# Patient Record
Sex: MALE | Race: WHITE | Employment: OTHER | URBAN - METROPOLITAN AREA
[De-identification: names, ages, dates, MRNs, and addresses within clinical notes are randomized per-mention and may not be internally consistent; named-entity substitution may affect disease eponyms.]

---

## 2017-01-09 ENCOUNTER — ALLIED HEALTH/NURSE VISIT (OUTPATIENT)
Dept: CARDIOLOGY | Facility: CLINIC | Age: 82
End: 2017-01-09
Payer: MEDICARE

## 2017-01-09 DIAGNOSIS — I44.2 ATRIOVENTRICULAR BLOCK, COMPLETE (H): Primary | ICD-10-CM

## 2017-01-09 PROCEDURE — 93280 PM DEVICE PROGR EVAL DUAL: CPT | Mod: ZF

## 2017-01-09 PROCEDURE — 93288 INTERROG EVL PM/LDLS PM IP: CPT | Mod: 26 | Performed by: INTERNAL MEDICINE

## 2017-01-09 NOTE — PROGRESS NOTES
"Patient seen in clinic for evaluation and iterative programming of his Medtronic dual lead pacemaker per MD orders.  Normal pacemaker function.  1 mode switch episode recorded - <1 min in duration.  Intrinsic rhythm = SB with CHB.  AP = 78.3%.   = 99.9%.  Estimated battery longevity to SUKHWINDER = 3 years.  Patient reports that he is feeling well.  However, patient reports that when he exercises he cannot get his HR above 70's bpm and he sometimes feel \"woozy\".  Rate response programmed from medium low to low.  Patient ambulated in hallways with HR up to 92 bpm.  Plan for patient to send a remote transmission in 3 months and return to clinic in 6 months.    Dual lead pacemaker        "

## 2017-01-09 NOTE — MR AVS SNAPSHOT
After Visit Summary   1/9/2017    Andrew Marrero    MRN: [id number]           Patient Information     Date Of Birth          4/15/1929        Visit Information        Provider Department      1/9/2017 1:30 PM 1, Uc Cv Device [facility]         Follow-ups after your visit        Your next 10 appointments already scheduled     Jul 10, 2017  1:00 PM   (Arrive by 12:45 PM)   Pacemaker Check with Uc Cv Device 1   [facility] ([facility] [place])    [address]  [address]rd [facility] [address] [phone]              Who to contact     If you have questions or need follow up information about today's clinic visit or your schedule please contact [facility] directly at [phone].  Normal or non-critical lab and imaging results will be communicated to you by [employer] Ide[employer], letter or phone within 4 business days after the clinic has received the results. If you do not hear from us within 7 days, please contact the clinic through Click[employer] or phone. If you have a critical or abnormal lab result, we will notify you by phone as soon as possible.  Submit refill requests through [employer] or call your pharmacy and they will forward the refill request to us. Please allow 3 business days for your refill to be completed.          Additional Information About Your Visit        Engineering Ide[employer] Information     [employer] gives you secure access to your electronic health record. If you see a primary care provider, you can also send messages to your care team and make appointments. If you have questions, please call your primary care clinic.  If you do not have a primary care provider, please call [phone] and they will assist you.        Care EveryWhere ID     This is your Care EveryWhere ID. This could be used by other organizations to access your [place] medical records  [id number]         Blood Pressure from Last 3 Encounters:   09/22/16 110/72   07/12/16 101/66    10/29/15 134/80    Weight from Last 3 Encounters:   09/22/16 68.04 kg (150 lb)   07/12/16 70.081 kg (154 lb 8 oz)   11/11/15 69.4 kg (153 lb)              Today, you had the following     No orders found for display       Primary Care Provider Office Phone # Fax #    Hardik Nuñez [phone]        [facility] [address] [facility] [address]  [facility] [address]        Thank you!     Thank you for choosing [facility]  for your care. Our goal is always to provide you with excellent care. Hearing back from our patients is one way we can continue to improve our services. Please take a few minutes to complete the written survey that you may receive in the mail after your visit with us. Thank you!             Your Updated Medication List - Protect others around you: Learn how to safely use, store and throw away your medicines at www.disposemymeds.org.          This list is accurate as of: 1/9/17  1:36 PM.  Always use your most recent med list.                   Brand Name Dispense Instructions for use    allopurinol 300 MG tablet    ZYLOPRIM    90 tablet    Take 1 tablet (300 mg) by mouth daily MUST BE SEEN IN CLINIC FOR ANY FURTHER REFILLS       aspirin 81 MG EC tablet      Take 81 mg by mouth daily.       atenolol 50 MG tablet    TENORMIN    45 tablet    Take 1 tablet (50 mg) by mouth every 48 hours in am       DOCUSATE SODIUM PO      Take 1-2 tablets by mouth daily       METAMUCIL PO      Take by mouth daily       Perindopril Erbumine 4 MG Tabs     90 tablet    Take 4 mg by mouth daily       simvastatin 40 MG tablet    ZOCOR    90 tablet    Take 1 tablet (40 mg) by mouth At Bedtime       vitamin D 1000 UNITS capsule      Take 1 capsule by mouth daily.

## 2017-01-09 NOTE — PATIENT INSTRUCTIONS
It was a pleasure to see you in clinic today.  Please do not hesitate to call with any questions or concerns.  You are scheduled for a remote transmission on 4/11/17.  We look forward to seeing you in clinic at your next device check in 6 months.    Norma Stark, RN, MS, CCRN  Electrophysiology Nurse Clinician  [facility]    During Business Hours Please Call:  [phone]  After Hours Please Call:  [phone] - select option #4 and ask for job code [phone]

## 2017-01-26 DIAGNOSIS — I25.10 CORONARY ARTERY DISEASE INVOLVING NATIVE HEART WITHOUT ANGINA PECTORIS, UNSPECIFIED VESSEL OR LESION TYPE: Primary | ICD-10-CM

## 2017-01-26 RX ORDER — SIMVASTATIN 40 MG
40 TABLET ORAL AT BEDTIME
Qty: 90 TABLET | Refills: 3 | Status: SHIPPED | OUTPATIENT
Start: 2017-01-26

## 2017-05-01 ENCOUNTER — ALLIED HEALTH/NURSE VISIT (OUTPATIENT)
Dept: CARDIOLOGY | Facility: CLINIC | Age: 82
End: 2017-05-01
Payer: MEDICARE

## 2017-05-01 DIAGNOSIS — I44.2 ATRIOVENTRICULAR BLOCK, COMPLETE (H): Primary | ICD-10-CM

## 2017-05-01 PROCEDURE — 93280 PM DEVICE PROGR EVAL DUAL: CPT | Mod: 26 | Performed by: INTERNAL MEDICINE

## 2017-05-01 PROCEDURE — 93280 PM DEVICE PROGR EVAL DUAL: CPT | Mod: ZF

## 2017-05-01 NOTE — MR AVS SNAPSHOT
After Visit Summary   5/1/2017    Andrew Marrero    MRN: [id number]           Patient Information     Date Of Birth          4/15/1929        Visit Information        Provider Department      5/1/2017 11:30 AM 1, Uc Cv Device [facility]        Care Instructions    It was a pleasure to see you in clinic today.  Please do not hesitate to call with any questions or concerns.  We look forward to seeing you in clinic at your next device check on 7/11/17 at 3:30 PM.    Norma Stark, RN, MS, CCRN  Electrophysiology Nurse Clinician  [facility]    During Business Hours Please Call:  [phone]  After Hours Please Call:  [phone] - select option #4 and ask for job code [phone]                  Follow-ups after your visit        Follow-up notes from your care team     Return in about 3 months (around 8/1/2017) for Pacemaker Check.      Your next 10 appointments already scheduled     May 01, 2017 11:30 AM CDT   (Arrive by 11:15 AM)   Pacemaker Check with Uc Cv Device 1   [facility] ([facility] [place])    [address] [place] [id number] [phone]            Jul 11, 2017  3:30 PM CDT   (Arrive by 3:15 PM)   Pacemaker Check with Uc Cv Device 1   [facility] ([facility] [place])    [address] [place] [address] [phone]            Jul 11, 2017  4:00 PM CDT   (Arrive by 3:45 PM)   RETURN HEART FAILURE with Balwinder Briones MD   [facility] ([facility] [place])    [address] [place] [id number] [phone]              Who to contact     If you have questions or need follow up information about today's clinic visit or your schedule please contact [facility] directly at [phone].  Normal or non-critical lab and imaging results will be communicated to you by MyChart, letter or phone within 4 business days  after the clinic has received the results. If you do not hear from us within 7 days, please contact the clinic through [employer] or phone. If you have a critical or abnormal lab result, we will notify you by phone as soon as possible.  Submit refill requests through [employer] or call your pharmacy and they will forward the refill request to us. Please allow 3 business days for your refill to be completed.          Additional Information About Your Visit        [employer] Information     [employer] gives you secure access to your electronic health record. If you see a primary care provider, you can also send messages to your care team and make appointments. If you have questions, please call your primary care clinic.  If you do not have a primary care provider, please call [phone] and they will assist you.        Care EveryWhere ID     This is your Care EveryWhere ID. This could be used by other organizations to access your [place] medical records  [id number]         Blood Pressure from Last 3 Encounters:   09/22/16 110/72   07/12/16 101/66   10/29/15 134/80    Weight from Last 3 Encounters:   09/22/16 68 kg (150 lb)   07/12/16 70.1 kg (154 lb 8 oz)   11/11/15 69.4 kg (153 lb)              Today, you had the following     No orders found for display       Primary Care Provider Office Phone # Fax #    Hardik Nuñez [phone]        [facility] [address] [facility] [address]  [facility] [address]        Thank you!     Thank you for choosing [facility]  for your care. Our goal is always to provide you with excellent care. Hearing back from our patients is one way we can continue to improve our services. Please take a few minutes to complete the written survey that you may receive in the mail after your visit with us. Thank you!             Your Updated Medication List - Protect others around you: Learn how to safely use, store and throw away your medicines at www.disposemymeds.org.           This list is accurate as of: 5/1/17 11:15 AM.  Always use your most recent med list.                   Brand Name Dispense Instructions for use    allopurinol 300 MG tablet    ZYLOPRIM    90 tablet    Take 1 tablet (300 mg) by mouth daily MUST BE SEEN IN CLINIC FOR ANY FURTHER REFILLS       aspirin 81 MG EC tablet      Take 81 mg by mouth daily.       atenolol 50 MG tablet    TENORMIN    45 tablet    Take 1 tablet (50 mg) by mouth every 48 hours in am       DOCUSATE SODIUM PO      Take 1-2 tablets by mouth daily       METAMUCIL PO      Take by mouth daily       Perindopril Erbumine 4 MG Tabs     90 tablet    Take 4 mg by mouth daily       simvastatin 40 MG tablet    ZOCOR    90 tablet    Take 1 tablet (40 mg) by mouth At Bedtime       vitamin D 1000 UNITS capsule      Take 1 capsule by mouth daily.

## 2017-05-01 NOTE — PROGRESS NOTES
Patient seen in clinic for evaluation and iterative programming of his Medtronic dual lead pacemaker per MD orders.  Normal pacemaker function.  1 mode switch episode recorded - <1 min in duration, no further data available for review.  Intrinsic rhythm = SB- @ 30 bpm.  AP = 85.4%.   = 100%.  Estimated battery longevity to SUKHWINDER = 3 years.  Patient reports that he is feeling well and denies specific complaints.  Plan for patient to return to clinic in 3 months.    Dual lead pacemaker

## 2017-05-01 NOTE — PATIENT INSTRUCTIONS
It was a pleasure to see you in clinic today.  Please do not hesitate to call with any questions or concerns.  We look forward to seeing you in clinic at your next device check on 7/11/17 at 3:30 PM.    Norma Stark, RN, MS, CCRN  Electrophysiology Nurse Clinician  [facility]    During Business Hours Please Call:  [phone]  After Hours Please Call:  [phone] - select option #4 and ask for job code [phone]

## 2017-06-01 DIAGNOSIS — Q24.8 LEFT VENTRICULAR OUTFLOW TRACT OBSTRUCTION: ICD-10-CM

## 2017-06-01 DIAGNOSIS — I51.9 HEART DISEASE: ICD-10-CM

## 2017-06-01 RX ORDER — ATENOLOL 50 MG/1
50 TABLET ORAL
Qty: 45 TABLET | Refills: 3 | Status: SHIPPED | OUTPATIENT
Start: 2017-06-01

## 2017-06-13 ENCOUNTER — TELEPHONE (OUTPATIENT)
Dept: DERMATOLOGY | Facility: CLINIC | Age: 82
End: 2017-06-13

## 2017-06-13 NOTE — TELEPHONE ENCOUNTER
Andrew has an appointment on Thursday 6/15/17 at 10:30 am with Dr. Don, but she has an opening that day at 8am, so she wants to see if maybe Andrew can come in at 8am for his appointment, instead of 10:30am.  Left clinic number to call us back.    Patricia Norwood LPN

## 2017-06-15 PROCEDURE — 88305 TISSUE EXAM BY PATHOLOGIST: CPT | Performed by: DERMATOLOGY

## 2017-09-07 ENCOUNTER — ALLIED HEALTH/NURSE VISIT (OUTPATIENT)
Dept: CARDIOLOGY | Facility: CLINIC | Age: 82
End: 2017-09-07
Payer: MEDICARE

## 2017-09-07 DIAGNOSIS — I44.2 ATRIOVENTRICULAR BLOCK, COMPLETE (H): Primary | ICD-10-CM

## 2017-09-07 PROCEDURE — 93288 INTERROG EVL PM/LDLS PM IP: CPT | Mod: 26 | Performed by: INTERNAL MEDICINE

## 2017-09-07 PROCEDURE — 93280 PM DEVICE PROGR EVAL DUAL: CPT | Mod: ZF

## 2017-09-07 NOTE — MR AVS SNAPSHOT
After Visit Summary   9/7/2017    Andrew Marrero    MRN: [id number]           Patient Information     Date Of Birth          4/15/1929        Visit Information        Provider Department      9/7/2017 2:00 PM 1, Uc Cv Device [facility]        Today's Diagnoses     Atrioventricular block, complete (H)    -  1      Care Instructions    It was a pleasure to see you in clinic today. Please do not hesitate to call with any questions or concerns. You are scheduled for a remote pacemaker transmission on 12/11/17. We look forward to seeing you in clinic at your next device check in 6 months.    Anna Levi, RN  Electrophysiology Nurse Clinician  [facility]  During business hours call:  [phone]  After business hours please call: [phone]- select option #4 and ask for job code [phone].             Follow-ups after your visit        Follow-up notes from your care team     Return in about 6 months (around 3/7/2018) for Medtronic, Pacemaker check.      Your next 10 appointments already scheduled     Apr 05, 2018  1:00 PM CDT   (Arrive by 12:45 PM)   Pacemaker Check with [place] Cv Device 1   [facility] ([facility])    [address] [place] [address] [phone]              Who to contact     If you have questions or need follow up information about today's clinic visit or your schedule please contact [facility] directly at [phone].  Normal or non-critical lab and imaging results will be communicated to you by MyChart, letter or phone within 4 business days after the clinic has received the results. If you do not hear from us within 7 days, please contact the clinic through MyChart or phone. If you have a critical or abnormal lab result, we will notify you by phone as soon as possible.  Submit refill requests through [employer] or call your pharmacy and they will forward the refill request to us.  Please allow 3 business days for your refill to be completed.          Additional Information About Your Visit        MyChart Information     Geomagichart gives you secure access to your electronic health record. If you see a primary care provider, you can also send messages to your care team and make appointments. If you have questions, please call your primary care clinic.  If you do not have a primary care provider, please call [phone] and they will assist you.        Care EveryWhere ID     This is your Care EveryWhere ID. This could be used by other organizations to access your [place] medical records  [id number]         Blood Pressure from Last 3 Encounters:   09/22/16 110/72   07/12/16 101/66   10/29/15 134/80    Weight from Last 3 Encounters:   09/22/16 68 kg (150 lb)   07/12/16 70.1 kg (154 lb 8 oz)   11/11/15 69.4 kg (153 lb)              We Performed the Following     PM DEVICE PROGRAMMING EVAL, DUAL LEAD PACER        Primary Care Provider Office Phone # Fax #    Martha Haile [phone]       [facility] [address]  [facility] [address]        Equal Access to Services     ANJELICA SHIELDS : Hadii aad ku hadasho Soomaali, waaxda luqadaha, qaybta kaalmada adeegyada, maryuri hickmanin hayshiran ellen fox. So [facility] [phone].    ATENCIÓN: Si habla español, tiene a carr disposición servicios gratuitos de asistencia lingüística. Llame al [phone].    We comply with applicable federal civil rights laws and [place] laws. We do not discriminate on the basis of race, color, national origin, age, disability sex, sexual orientation or gender identity.            Thank you!     Thank you for choosing [facility]  for your care. Our goal is always to provide you with excellent care. Hearing back from our patients is one way we can continue to improve our services. Please take a few minutes to complete the written survey that you may receive in the  mail after your visit with us. Thank you!             Your Updated Medication List - Protect others around you: Learn how to safely use, store and throw away your medicines at www.disposemymeds.org.          This list is accurate as of: 9/7/17  2:04 PM.  Always use your most recent med list.                   Brand Name Dispense Instructions for use Diagnosis    allopurinol 300 MG tablet    ZYLOPRIM    90 tablet    Take 1 tablet (300 mg) by mouth daily MUST BE SEEN IN CLINIC FOR ANY FURTHER REFILLS    Gout, unspecified       aspirin 81 MG EC tablet      Take 81 mg by mouth daily.        atenolol 50 MG tablet    TENORMIN    45 tablet    Take 1 tablet (50 mg) by mouth every 48 hours in am    Left ventricular outflow tract obstruction, Heart disease       clobetasol 0.05 % cream    TEMOVATE     Apply topically as needed        DOCUSATE SODIUM PO      Take 1-2 tablets by mouth daily    Shortness of breath, Unspecified essential hypertension       METAMUCIL PO      Take by mouth daily        Perindopril Erbumine 4 MG Tabs     90 tablet    Take 4 mg by mouth daily    Secondary hypertension, unspecified       simvastatin 40 MG tablet    ZOCOR    90 tablet    Take 1 tablet (40 mg) by mouth At Bedtime    Coronary artery disease involving native heart without angina pectoris, unspecified vessel or lesion type       vitamin D 1000 UNITS capsule      Take 1 capsule by mouth daily.

## 2017-09-07 NOTE — PROGRESS NOTES
Patient seen in clinic for evaluation and iterative programming of his Medtronic dual lead pacemaker per MD orders.  Normal pacemaker function.  25 mode switch episodes recorded - < 1 min in duration each.  Intrinsic rhythm = SB with  @ 30 bpm.  AP = 82.8%.   = 100%.  Estimated battery longevity to SUKHWINDER = 2.5 years.  Patient reports that he is feeling well and denies specific complaints.  Plan for patient to send a remote transmission in 3 months and return to clinic in 6 months.  Monitor teaching provided to patient today.  Remote transmission from clinic successful.    Dual lead pacemaker

## 2017-09-07 NOTE — PATIENT INSTRUCTIONS
It was a pleasure to see you in clinic today. Please do not hesitate to call with any questions or concerns. You are scheduled for a remote pacemaker transmission on 12/11/17. We look forward to seeing you in clinic at your next device check in 6 months.    Anna Levi, RN  Electrophysiology Nurse Clinician  [facility]  During business hours call:  [phone]  After business hours please call: [phone]- select option #4 and ask for job code [phone].

## 2017-12-11 ENCOUNTER — ALLIED HEALTH/NURSE VISIT (OUTPATIENT)
Dept: CARDIOLOGY | Facility: CLINIC | Age: 82
End: 2017-12-11
Attending: INTERNAL MEDICINE
Payer: MEDICARE

## 2017-12-11 DIAGNOSIS — I44.2 ATRIOVENTRICULAR BLOCK, COMPLETE (H): Primary | ICD-10-CM

## 2017-12-11 PROCEDURE — 93294 REM INTERROG EVL PM/LDLS PM: CPT | Performed by: INTERNAL MEDICINE

## 2017-12-11 PROCEDURE — 93296 REM INTERROG EVL PM/IDS: CPT | Mod: ZF

## 2017-12-11 NOTE — MR AVS SNAPSHOT
After Visit Summary   12/11/2017    Andrew Marrero    MRN: [id number]           Patient Information     Date Of Birth          4/15/1929        Visit Information        Provider Department      12/11/2017 6:00 AM IRIS ICD REMOTE [facility]        Today's Diagnoses     Atrioventricular block, complete (H)    -  1       Follow-ups after your visit        Your next 10 appointments already scheduled     Apr 05, 2018  1:00 PM CDT   (Arrive by 12:45 PM)   Pacemaker Check with  Cv Device 1   [facility] ([email or web address] [place])    [address] [place] [address]  3rd Floor  [address]                 Who to contact     If you have questions or need follow up information about today's clinic visit or your schedule please contact [facility] directly at [phone].  Normal or non-critical lab and imaging results will be communicated to you by [employer] Pharmaceuticalshart, letter or phone within 4 business days after the clinic has received the results. If you do not hear from us within 7 days, please contact the clinic through [employer]art or phone. If you have a critical or abnormal lab result, we will notify you by phone as soon as possible.  Submit refill requests through [employer] or call your pharmacy and they will forward the refill request to us. Please allow 3 business days for your refill to be completed.          Additional Information About Your Visit        [employer]art Information     [employer] gives you secure access to your electronic health record. If you see a primary care provider, you can also send messages to your care team and make appointments. If you have questions, please call your primary care clinic.  If you do not have a primary care provider, please call [phone] and they will assist you.        Care EveryWhere ID     This is your Care EveryWhere ID. This could be used by other organizations to access your [place] medical records  [id number]         Blood Pressure from Last 3  Encounters:   09/22/16 110/72   07/12/16 101/66   10/29/15 134/80    Weight from Last 3 Encounters:   09/22/16 68 kg (150 lb)   07/12/16 70.1 kg (154 lb 8 oz)   11/11/15 69.4 kg (153 lb)              We Performed the Following     INTERROGATION DEVICE EVAL REMOTE, PACER/ICD        Primary Care Provider Office Phone # Fax #    Martha Haile [phone]       [facility] [address]  [facility] [address]        Equal Access to Services     [facility] CenterJORGE : Hadii aad ku hadasho Soomaali, waaxda luqadaha, qaybta kaalmada adepamyada, maryuri garrido . So [facility] [phone].    ATENCIÓN: Si habla español, tiene a carr disposición servicios gratuitos de asistencia lingüística. LlSelect [facility] [phone].    We comply with applicable federal civil rights laws and [place] laws. We do not discriminate on the basis of race, color, national origin, age, disability, sex, sexual orientation, or gender identity.            Thank you!     Thank you for choosing [facility]  for your care. Our goal is always to provide you with excellent care. Hearing back from our patients is one way we can continue to improve our services. Please take a few minutes to complete the written survey that you may receive in the mail after your visit with us. Thank you!             Your Updated Medication List - Protect others around you: Learn how to safely use, store and throw away your medicines at www.disposemymeds.org.          This list is accurate as of: 12/11/17 11:59 PM.  Always use your most recent med list.                   Brand Name Dispense Instructions for use Diagnosis    allopurinol 300 MG tablet    ZYLOPRIM    90 tablet    Take 1 tablet (300 mg) by mouth daily MUST BE SEEN IN CLINIC FOR ANY FURTHER REFILLS    Gout, unspecified       aspirin 81 MG EC tablet      Take 81 mg by mouth daily.        atenolol 50 MG tablet    TENORMIN    45 tablet    Take 1 tablet  (50 mg) by mouth every 48 hours in am    Left ventricular outflow tract obstruction, Heart disease       clobetasol 0.05 % cream    TEMOVATE     Apply topically as needed        DOCUSATE SODIUM PO      Take 1-2 tablets by mouth daily    Shortness of breath, Unspecified essential hypertension       METAMUCIL PO      Take by mouth daily        simvastatin 40 MG tablet    ZOCOR    90 tablet    Take 1 tablet (40 mg) by mouth At Bedtime    Coronary artery disease involving native heart without angina pectoris, unspecified vessel or lesion type       vitamin D 1000 UNITS capsule      Take 1 capsule by mouth daily.

## 2017-12-12 DIAGNOSIS — I10 ESSENTIAL HYPERTENSION: Primary | ICD-10-CM

## 2017-12-12 RX ORDER — PERINDOPRIL ERBUMINE 4 MG/1
4 TABLET ORAL DAILY
Qty: 90 TABLET | Refills: 3 | Status: SHIPPED | OUTPATIENT
Start: 2017-12-12

## 2017-12-13 NOTE — PROGRESS NOTES
Scheduled Medtronic Carelink remote pacemaker transmission received and reviewed. Device transmission sent per MD orders. His presenting rhythm is AP/ @ 62 bpm. No arrhythmias recorded. Normal device function. AP= 94% and = 100%. Lead trends appear stable. Battery estimates 28 (11- 44) months to SUKHWINDER. Pt notified of transmission results. Plan for pt to RTC in 3 months as scheduled.  Remote pacemaker transmission

## 2018-04-10 ENCOUNTER — ALLIED HEALTH/NURSE VISIT (OUTPATIENT)
Dept: CARDIOLOGY | Facility: CLINIC | Age: 83
End: 2018-04-10
Attending: INTERNAL MEDICINE
Payer: MEDICARE

## 2018-04-10 DIAGNOSIS — I44.2 ATRIOVENTRICULAR BLOCK, COMPLETE (H): Primary | ICD-10-CM

## 2018-04-10 PROCEDURE — 93280 PM DEVICE PROGR EVAL DUAL: CPT | Mod: ZF

## 2018-04-10 NOTE — MR AVS SNAPSHOT
After Visit Summary   4/10/2018    Andrew Marrero    MRN: [id number]           Patient Information     Date Of Birth          4/15/1929        Visit Information        Provider Department      4/10/2018 2:00 PM 1, Uc Cv Device [facility]        Today's Diagnoses     Atrioventricular block, complete (H)    -  1      Care Instructions    It was a pleasure to see you in clinic today. Please do not hesitate to call with any questions or concerns. You are scheduled for a remote pacemaker transmission on 7/16/18. We will call in 1-2 business days to discuss the results with you. We look forward to seeing you in clinic at your next device check in 6 months.    Anna Levi, RN  Electrophysiology Nurse Clinician  [facility]  During business hours call:  [phone]  After business hours please call: [phone]- select option #4 and ask for job code [phone].            Follow-ups after your visit        Additional Services     Follow-Up with Cardiologist       Available MD (Matt Leblanc, or Vincent) in 6 months with device appt prior. Dxx: device near [place].            Follow-Up with Device Clinic-6 months                 Your next 10 appointments already scheduled     Oct 08, 2018  1:30 PM CDT   (Arrive by 1:15 PM)   Pacemaker Check with Uc Cv Device 1   [facility] ([facility])    [address] [place] [id number] [phone]              Future tests that were ordered for you today     Open Future Orders        Priority Expected Expires Ordered    Follow-Up with Cardiologist Routine 4/17/2018 7/16/2018 4/10/2018    Follow-Up with Device Clinic-6 months Routine 10/7/2018 1/5/2019 4/10/2018            Who to contact     If you have questions or need follow up information about today's clinic visit or your schedule please contact [facility] directly at [phone].  Normal or non-critical lab  and imaging results will be communicated to you by MyChart, letter or phone within 4 business days after the clinic has received the results. If you do not hear from us within 7 days, please contact the clinic through CopyRightNowt or phone. If you have a critical or abnormal lab result, we will notify you by phone as soon as possible.  Submit refill requests through Clout or call your pharmacy and they will forward the refill request to us. Please allow 3 business days for your refill to be completed.          Additional Information About Your Visit        Cardiovascular Simulationhart Information     Clout gives you secure access to your electronic health record. If you see a primary care provider, you can also send messages to your care team and make appointments. If you have questions, please call your primary care clinic.  If you do not have a primary care provider, please call [phone] and they will assist you.        Care EveryWhere ID     This is your Care EveryWhere ID. This could be used by other organizations to access your [facility] medical records  [id number]         Blood Pressure from Last 3 Encounters:   09/22/16 110/72   07/12/16 101/66   10/29/15 134/80    Weight from Last 3 Encounters:   09/22/16 68 kg (150 lb)   07/12/16 70.1 kg (154 lb 8 oz)   11/11/15 69.4 kg (153 lb)              We Performed the Following     (78537) PM DEVICE PROGRAMMING EVAL, DUAL LEAD PACER        Primary Care Provider Office Phone # Fax #    Martha FARIHA Haile [phone]       [facility] [address]  [facility] [address]        Equal Access to Services     [facility] Center[facility] : Hadii aad ku hadasho Soomaali, waaxda luqadaha, qaybta kaalmada sary, maryuri garrido . So [facility] [phone].    ATENCIÓN: Si habla español, tiene a carr disposición servicios gratuitos de asistencia lingüística. Llame al [phone].    We comply with applicable federal civil rights laws and  [place] laws. We do not discriminate on the basis of race, color, national origin, age, disability, sex, sexual orientation, or gender identity.            Thank you!     Thank you for choosing [facility]  for your care. Our goal is always to provide you with excellent care. Hearing back from our patients is one way we can continue to improve our services. Please take a few minutes to complete the written survey that you may receive in the mail after your visit with us. Thank you!             Your Updated Medication List - Protect others around you: Learn how to safely use, store and throw away your medicines at www.disposemymeds.org.          This list is accurate as of 4/10/18  2:00 PM.  Always use your most recent med list.                   Brand Name Dispense Instructions for use Diagnosis    allopurinol 300 MG tablet    ZYLOPRIM    90 tablet    Take 1 tablet (300 mg) by mouth daily MUST BE SEEN IN CLINIC FOR ANY FURTHER REFILLS    Gout, unspecified       aspirin 81 MG EC tablet      Take 81 mg by mouth daily.        atenolol 50 MG tablet    TENORMIN    45 tablet    Take 1 tablet (50 mg) by mouth every 48 hours in am    Left ventricular outflow tract obstruction, Heart disease       clobetasol 0.05 % cream    TEMOVATE     Apply topically as needed        DOCUSATE SODIUM PO      Take 1-2 tablets by mouth daily    Shortness of breath, Unspecified essential hypertension       METAMUCIL PO      Take by mouth daily        Perindopril Erbumine 4 MG Tabs     90 tablet    Take 4 mg by mouth daily    Essential hypertension       simvastatin 40 MG tablet    ZOCOR    90 tablet    Take 1 tablet (40 mg) by mouth At Bedtime    Coronary artery disease involving native heart without angina pectoris, unspecified vessel or lesion type       vitamin D 1000 units capsule      Take 1 capsule by mouth daily.

## 2018-04-10 NOTE — PROGRESS NOTES
Preliminary Device Interrogation Results.  Final physician signed paceart report to be scanned and attached.    Pt seen in the [facility] for evaluation and iterative programming of a Medtronic, dual lead pacemaker, per MD orders. Today his intrinsic rhythm is Sinus 70 with CHB- ventricular rate is <30 bpm. Normal pacemaker function. 1 AT/AF episode recorded lasting <1 minute. Lead trends appear stable. AP= 90% and = 100%. Pt reports he has been feeling SOB when climbing stairs and dizzy when standing up from the floor after a work out. Battery estimates 23 months (7-40  Months) to [place]. Plan for pt to send a remote transmission on 7/16/18. In 6 months he will RTC for a device check and an appt to establish care with an EP to discuss generator change.  Dual lead pacemaker

## 2018-04-10 NOTE — PATIENT INSTRUCTIONS
It was a pleasure to see you in clinic today. Please do not hesitate to call with any questions or concerns. You are scheduled for a remote pacemaker transmission on 7/16/18. We will call in 1-2 business days to discuss the results with you. We look forward to seeing you in clinic at your next device check in 6 months.    Anna Levi, RN  Electrophysiology Nurse Clinician  [facility]  During business hours call:  [phone]  After business hours please call: [phone]- select option #4 and ask for job code [phone].

## 2018-07-09 ENCOUNTER — ALLIED HEALTH/NURSE VISIT (OUTPATIENT)
Dept: CARDIOLOGY | Facility: CLINIC | Age: 83
End: 2018-07-09
Attending: INTERNAL MEDICINE
Payer: MEDICARE

## 2018-07-09 DIAGNOSIS — I44.2 ATRIOVENTRICULAR BLOCK, COMPLETE (H): Primary | ICD-10-CM

## 2018-07-09 PROCEDURE — 93294 REM INTERROG EVL PM/LDLS PM: CPT | Performed by: INTERNAL MEDICINE

## 2018-07-09 PROCEDURE — 93296 REM INTERROG EVL PM/IDS: CPT | Mod: ZF

## 2018-07-09 NOTE — MR AVS SNAPSHOT
After Visit Summary   7/9/2018    Andrew Marrero    MRN: [id number]           Patient Information     Date Of Birth          4/15/1929        Visit Information        Provider Department      7/9/2018 6:00 AM IRIS ICD REMOTE [facility]        Today's Diagnoses     Atrioventricular block, complete (H)    -  1       Follow-ups after your visit        Your next 10 appointments already scheduled     Oct 08, 2018  1:30 PM CDT   (Arrive by 1:15 PM)   Pacemaker Check with  Cv Device 1   [facility] ([facility])    [address] [place] [address]  [address] [place] [id number] [phone]              Who to contact     If you have questions or need follow up information about today's clinic visit or your schedule please contact [facility] directly at [phone].  Normal or non-critical lab and imaging results will be communicated to you by Boyibanghart, letter or phone within 4 business days after the clinic has received the results. If you do not hear from us within 7 days, please contact the clinic through Boyibanghart or phone. If you have a critical or abnormal lab result, we will notify you by phone as soon as possible.  Submit refill requests through BlueLithium or call your pharmacy and they will forward the refill request to us. Please allow 3 business days for your refill to be completed.          Additional Information About Your Visit        Boyibanghart Information     BlueLithium gives you secure access to your electronic health record. If you see a primary care provider, you can also send messages to your care team and make appointments. If you have questions, please call your primary care clinic.  If you do not have a primary care provider, please call [phone] and they will assist you.        Care EveryWhere ID     This is your Care EveryWhere ID. This could be used by other organizations to access your [facility] medical records  [id number]         Blood  Pressure from Last 3 Encounters:   09/22/16 110/72   07/12/16 101/66   10/29/15 134/80    Weight from Last 3 Encounters:   09/22/16 68 kg (150 lb)   07/12/16 70.1 kg (154 lb 8 oz)   11/11/15 69.4 kg (153 lb)              We Performed the Following     INTERROGATION DEVICE EVAL REMOTE, PACER/ICD        Primary Care Provider Office Phone # Fax #    Martha Haile [phone]       [facility] [address]  [facility] [address]        Equal Access to Services     [facility] : Hadii aad ku hadasho Soomaali, waaxda luqadaha, qaybta kaalmada adeegyada, maryuri stolln ellen garrido . So [facility] [phone].    ATENCIÓN: Si habla español, tiene a carr disposición servicios gratuitos de asistencia lingüística. Llame al [phone].    We comply with applicable federal civil rights laws and [place] laws. We do not discriminate on the basis of race, color, national origin, age, disability, sex, sexual orientation, or gender identity.            Thank you!     Thank you for choosing [facility]  for your care. Our goal is always to provide you with excellent care. Hearing back from our patients is one way we can continue to improve our services. Please take a few minutes to complete the written survey that you may receive in the mail after your visit with us. Thank you!             Your Updated Medication List - Protect others around you: Learn how to safely use, store and throw away your medicines at www.disposemymeds.org.          This list is accurate as of 7/9/18 11:59 PM.  Always use your most recent med list.                   Brand Name Dispense Instructions for use Diagnosis    allopurinol 300 MG tablet    ZYLOPRIM    90 tablet    Take 1 tablet (300 mg) by mouth daily MUST BE SEEN IN CLINIC FOR ANY FURTHER REFILLS    Gout, unspecified       aspirin 81 MG EC tablet      Take 81 mg by mouth daily.        atenolol 50 MG tablet    TENORMIN    45 tablet     Take 1 tablet (50 mg) by mouth every 48 hours in am    Left ventricular outflow tract obstruction, Heart disease       clobetasol 0.05 % cream    TEMOVATE     Apply topically as needed        DOCUSATE SODIUM PO      Take 1-2 tablets by mouth daily    Shortness of breath, Unspecified essential hypertension       METAMUCIL PO      Take by mouth daily        Perindopril Erbumine 4 MG Tabs     90 tablet    Take 4 mg by mouth daily    Essential hypertension       simvastatin 40 MG tablet    ZOCOR    90 tablet    Take 1 tablet (40 mg) by mouth At Bedtime    Coronary artery disease involving native heart without angina pectoris, unspecified vessel or lesion type       vitamin D 1000 units capsule      Take 1 capsule by mouth daily.

## 2018-07-13 NOTE — PROGRESS NOTES
Preliminary Device Interrogation Results.  Final physician signed paceart report to be scanned and attached.    Medtronic dual lead pacemaker remote transmission received and reviewed. Device transmission sent per MD orders. His presenting rhythm is AP/  @ 70 bpm. 1 NSVT episode recorded lasting 4 seconds on 6/28/18. Normal device function. AP= 63% and = 99%. Lead trends appear stable. Battery estimates 11 months (<1-23 months) to [place]. Plan for pt to RTC in 3 months as scheduled.  Remote pacemaker transmission

## 2018-11-15 ENCOUNTER — ALLIED HEALTH/NURSE VISIT (OUTPATIENT)
Dept: CARDIOLOGY | Facility: CLINIC | Age: 83
End: 2018-11-15
Attending: INTERNAL MEDICINE
Payer: MEDICARE

## 2018-11-15 DIAGNOSIS — I44.2 ATRIOVENTRICULAR BLOCK, COMPLETE (H): Primary | ICD-10-CM

## 2018-11-15 PROCEDURE — 93296 REM INTERROG EVL PM/IDS: CPT | Mod: ZF

## 2018-11-15 PROCEDURE — 93294 REM INTERROG EVL PM/LDLS PM: CPT | Performed by: INTERNAL MEDICINE

## 2018-11-15 NOTE — MR AVS SNAPSHOT
After Visit Summary   11/15/2018    Andrew Marrero    MRN: [id number]           Patient Information     Date Of Birth          4/15/1929        Visit Information        Provider Department      11/15/2018 6:00 AM  ICD REMOTE [facility]        Today's Diagnoses     Atrioventricular block, complete (H)    -  1       Follow-ups after your visit        Who to contact     If you have questions or need follow up information about today's clinic visit or your schedule please contact [facility] directly at [phone].  Normal or non-critical lab and imaging results will be communicated to you by I & Combinehart, letter or phone within 4 business days after the clinic has received the results. If you do not hear from us within 7 days, please contact the clinic through Fontactot or phone. If you have a critical or abnormal lab result, we will notify you by phone as soon as possible.  Submit refill requests through Deepclass or call your pharmacy and they will forward the refill request to us. Please allow 3 business days for your refill to be completed.          Additional Information About Your Visit        MyChart Information     Deepclass gives you secure access to your electronic health record. If you see a primary care provider, you can also send messages to your care team and make appointments. If you have questions, please call your primary care clinic.  If you do not have a primary care provider, please call [phone] and they will assist you.        Care EveryWhere ID     This is your Care EveryWhere ID. This could be used by other organizations to access your [facility] medical records  [id number]         Blood Pressure from Last 3 Encounters:   09/22/16 110/72   07/12/16 101/66   10/29/15 134/80    Weight from Last 3 Encounters:   09/22/16 68 kg (150 lb)   07/12/16 70.1 kg (154 lb 8 oz)   11/11/15 69.4 kg (153 lb)              We Performed the Following     INTERROGATION DEVICE EVAL  REMOTE, PACER/ICD        Primary Care Provider Office Phone # Fax #    Martha FARIHA Haile [phone]       [facility] [address]  [facility] [address]        Equal Access to Services     ABBIE SHIELDS : Hadii heaven max ahsano Sokatiuskaali, waaxda luqadaha, qaybta kaalmada adepamyada, maryuri stolln antoniapam mcadams hema fox. So [facility] [phone].    ATENCIÓN: Si habla español, tiene a carr disposición servicios gratuitos de asistencia lingüística. Llame al [phone].    We comply with applicable federal civil rights laws and [place] laws. We do not discriminate on the basis of race, color, national origin, age, disability, sex, sexual orientation, or gender identity.            Thank you!     Thank you for choosing [facility]  for your care. Our goal is always to provide you with excellent care. Hearing back from our patients is one way we can continue to improve our services. Please take a few minutes to complete the written survey that you may receive in the mail after your visit with us. Thank you!             Your Updated Medication List - Protect others around you: Learn how to safely use, store and throw away your medicines at www.disposemymeds.org.          This list is accurate as of 11/15/18 11:59 PM.  Always use your most recent med list.                   Brand Name Dispense Instructions for use Diagnosis    allopurinol 300 MG tablet    ZYLOPRIM    90 tablet    Take 1 tablet (300 mg) by mouth daily MUST BE SEEN IN CLINIC FOR ANY FURTHER REFILLS    Gout, unspecified       aspirin 81 MG EC tablet      Take 81 mg by mouth daily.        atenolol 50 MG tablet    TENORMIN    45 tablet    Take 1 tablet (50 mg) by mouth every 48 hours in am    Left ventricular outflow tract obstruction, Heart disease       clobetasol 0.05 % cream    TEMOVATE     Apply topically as needed        DOCUSATE SODIUM PO      Take 1-2 tablets by mouth daily    Shortness of breath,  Unspecified essential hypertension       METAMUCIL PO      Take by mouth daily        perindopril erbumine 4 MG tablet    ACEON    90 tablet    Take 4 mg by mouth daily    Essential hypertension       simvastatin 40 MG tablet    ZOCOR    90 tablet    Take 1 tablet (40 mg) by mouth At Bedtime    Coronary artery disease involving native heart without angina pectoris, unspecified vessel or lesion type       vitamin D 1000 units capsule      Take 1 capsule by mouth daily.

## 2018-11-20 NOTE — PROGRESS NOTES
Preliminary Device Interrogation Results.  Final physician signed paceart report to be scanned and attached.    Remote pacemaker transmission received and reviewed.  Device transmission sent per MD orders.  Patient has a Medtronic dual lead pacemaker.  Normal pacemaker function.  No arrhythmias recorded.  Presenting EGM = AP- @ 75 bpm.  AP = 45.4%.   = 100%.  Estimated battery longevity to [place] = 9.5 years.  Patient notified of interrogation results.  Patient reports that he is feeling dizzy which is not a new finding for him.  Patient reports that he has an appt with a Cardiologist at [facility] on 11/21/18.  Plan for patient to send a remote transmission in 3 months as scheduled.    Remote pacemaker transmission

## 2019-01-08 ENCOUNTER — PATIENT OUTREACH (OUTPATIENT)
Dept: OTOLARYNGOLOGY | Facility: CLINIC | Age: 84
End: 2019-01-08

## 2019-01-08 NOTE — PATIENT INSTRUCTIONS
Spoke with pt in regards to referral for dizziness. Inquired it pt was able to follow up with Neurology as recommended. Pt states that he has not pursued this. Offered to assist pt in scheduling this, pt declines at this time. Pt states he has been doing the exercises recommended by Radha Navarrete and in addition increased his water intake which has significantly reduced his symptoms of dizziness. Pt states he was more interested in meeting with Dr. Boucher to pursue a hearing aid. Offered pt appt to address this, but pt declines. States he will see his local MD to pursue hearing aids. States he will think over meeting with Neurology and contact our clinic if he decides he would like to proceed. Sho HERNANDEZ RNCC

## 2019-02-18 ENCOUNTER — ANCILLARY PROCEDURE (OUTPATIENT)
Dept: CARDIOLOGY | Facility: CLINIC | Age: 84
End: 2019-02-18
Attending: INTERNAL MEDICINE
Payer: MEDICARE

## 2019-02-18 DIAGNOSIS — I48.92 ATRIAL FIBRILLATION AND FLUTTER (H): ICD-10-CM

## 2019-02-18 DIAGNOSIS — I48.91 ATRIAL FIBRILLATION AND FLUTTER (H): ICD-10-CM

## 2019-02-18 PROCEDURE — 93296 REM INTERROG EVL PM/IDS: CPT | Mod: ZF

## 2019-02-18 PROCEDURE — 93294 REM INTERROG EVL PM/LDLS PM: CPT | Performed by: INTERNAL MEDICINE

## 2019-03-10 LAB
MDC_IDC_LEAD_IMPLANT_DT: NORMAL
MDC_IDC_LEAD_IMPLANT_DT: NORMAL
MDC_IDC_LEAD_LOCATION: NORMAL
MDC_IDC_LEAD_LOCATION: NORMAL
MDC_IDC_LEAD_MFG: NORMAL
MDC_IDC_LEAD_MFG: NORMAL
MDC_IDC_LEAD_MODEL: NORMAL
MDC_IDC_LEAD_MODEL: NORMAL
MDC_IDC_LEAD_POLARITY_TYPE: NORMAL
MDC_IDC_LEAD_POLARITY_TYPE: NORMAL
MDC_IDC_LEAD_SERIAL: NORMAL
MDC_IDC_LEAD_SERIAL: NORMAL
MDC_IDC_LEAD_SPECIAL_FUNCTION: NORMAL
MDC_IDC_LEAD_SPECIAL_FUNCTION: NORMAL
MDC_IDC_MSMT_BATTERY_DTM: NORMAL
MDC_IDC_MSMT_BATTERY_REMAINING_LONGEVITY: 110 MO
MDC_IDC_MSMT_BATTERY_RRT_TRIGGER: 2.62
MDC_IDC_MSMT_BATTERY_STATUS: NORMAL
MDC_IDC_MSMT_BATTERY_VOLTAGE: 3.14 V
MDC_IDC_MSMT_LEADCHNL_RA_IMPEDANCE_VALUE: 323 OHM
MDC_IDC_MSMT_LEADCHNL_RA_IMPEDANCE_VALUE: 380 OHM
MDC_IDC_MSMT_LEADCHNL_RA_PACING_THRESHOLD_AMPLITUDE: 0.5 V
MDC_IDC_MSMT_LEADCHNL_RA_PACING_THRESHOLD_PULSEWIDTH: 0.4 MS
MDC_IDC_MSMT_LEADCHNL_RA_SENSING_INTR_AMPL: 0.62 MV
MDC_IDC_MSMT_LEADCHNL_RA_SENSING_INTR_AMPL: 0.62 MV
MDC_IDC_MSMT_LEADCHNL_RV_IMPEDANCE_VALUE: 323 OHM
MDC_IDC_MSMT_LEADCHNL_RV_IMPEDANCE_VALUE: 399 OHM
MDC_IDC_MSMT_LEADCHNL_RV_PACING_THRESHOLD_AMPLITUDE: 1.12 V
MDC_IDC_MSMT_LEADCHNL_RV_PACING_THRESHOLD_PULSEWIDTH: 0.4 MS
MDC_IDC_PG_IMPLANT_DTM: NORMAL
MDC_IDC_PG_MFG: NORMAL
MDC_IDC_PG_MODEL: NORMAL
MDC_IDC_PG_SERIAL: NORMAL
MDC_IDC_PG_TYPE: NORMAL
MDC_IDC_SESS_CLINIC_NAME: NORMAL
MDC_IDC_SESS_DTM: NORMAL
MDC_IDC_SESS_TYPE: NORMAL
MDC_IDC_SET_BRADY_AT_MODE_SWITCH_RATE: 150 {BEATS}/MIN
MDC_IDC_SET_BRADY_HYSTRATE: NORMAL
MDC_IDC_SET_BRADY_LOWRATE: 60 {BEATS}/MIN
MDC_IDC_SET_BRADY_MAX_SENSOR_RATE: 120 {BEATS}/MIN
MDC_IDC_SET_BRADY_MAX_TRACKING_RATE: 120 {BEATS}/MIN
MDC_IDC_SET_BRADY_MODE: NORMAL
MDC_IDC_SET_BRADY_PAV_DELAY_LOW: 180 MS
MDC_IDC_SET_BRADY_SAV_DELAY_LOW: 150 MS
MDC_IDC_SET_LEADCHNL_RA_PACING_AMPLITUDE: 1.5 V
MDC_IDC_SET_LEADCHNL_RA_PACING_ANODE_ELECTRODE_1: NORMAL
MDC_IDC_SET_LEADCHNL_RA_PACING_ANODE_LOCATION_1: NORMAL
MDC_IDC_SET_LEADCHNL_RA_PACING_CAPTURE_MODE: NORMAL
MDC_IDC_SET_LEADCHNL_RA_PACING_CATHODE_ELECTRODE_1: NORMAL
MDC_IDC_SET_LEADCHNL_RA_PACING_CATHODE_LOCATION_1: NORMAL
MDC_IDC_SET_LEADCHNL_RA_PACING_POLARITY: NORMAL
MDC_IDC_SET_LEADCHNL_RA_PACING_PULSEWIDTH: 0.4 MS
MDC_IDC_SET_LEADCHNL_RA_SENSING_ANODE_ELECTRODE_1: NORMAL
MDC_IDC_SET_LEADCHNL_RA_SENSING_ANODE_LOCATION_1: NORMAL
MDC_IDC_SET_LEADCHNL_RA_SENSING_CATHODE_ELECTRODE_1: NORMAL
MDC_IDC_SET_LEADCHNL_RA_SENSING_CATHODE_LOCATION_1: NORMAL
MDC_IDC_SET_LEADCHNL_RA_SENSING_POLARITY: NORMAL
MDC_IDC_SET_LEADCHNL_RA_SENSING_SENSITIVITY: 0.3 MV
MDC_IDC_SET_LEADCHNL_RV_PACING_AMPLITUDE: 3 V
MDC_IDC_SET_LEADCHNL_RV_PACING_ANODE_ELECTRODE_1: NORMAL
MDC_IDC_SET_LEADCHNL_RV_PACING_ANODE_LOCATION_1: NORMAL
MDC_IDC_SET_LEADCHNL_RV_PACING_CAPTURE_MODE: NORMAL
MDC_IDC_SET_LEADCHNL_RV_PACING_CATHODE_ELECTRODE_1: NORMAL
MDC_IDC_SET_LEADCHNL_RV_PACING_CATHODE_LOCATION_1: NORMAL
MDC_IDC_SET_LEADCHNL_RV_PACING_POLARITY: NORMAL
MDC_IDC_SET_LEADCHNL_RV_PACING_PULSEWIDTH: 0.4 MS
MDC_IDC_SET_LEADCHNL_RV_SENSING_ANODE_ELECTRODE_1: NORMAL
MDC_IDC_SET_LEADCHNL_RV_SENSING_ANODE_LOCATION_1: NORMAL
MDC_IDC_SET_LEADCHNL_RV_SENSING_CATHODE_ELECTRODE_1: NORMAL
MDC_IDC_SET_LEADCHNL_RV_SENSING_CATHODE_LOCATION_1: NORMAL
MDC_IDC_SET_LEADCHNL_RV_SENSING_POLARITY: NORMAL
MDC_IDC_SET_LEADCHNL_RV_SENSING_SENSITIVITY: 0.9 MV
MDC_IDC_SET_ZONE_DETECTION_INTERVAL: 400 MS
MDC_IDC_SET_ZONE_DETECTION_INTERVAL: 400 MS
MDC_IDC_SET_ZONE_TYPE: NORMAL
MDC_IDC_STAT_BRADY_AP_VP_PERCENT: 51.91 %
MDC_IDC_STAT_BRADY_AP_VS_PERCENT: 0 %
MDC_IDC_STAT_BRADY_AS_VP_PERCENT: 48.01 %
MDC_IDC_STAT_BRADY_AS_VS_PERCENT: 0.08 %
MDC_IDC_STAT_BRADY_DTM_END: NORMAL
MDC_IDC_STAT_BRADY_DTM_START: NORMAL
MDC_IDC_STAT_BRADY_RA_PERCENT_PACED: 51.9 %
MDC_IDC_STAT_BRADY_RV_PERCENT_PACED: 99.92 %
MDC_IDC_STAT_EPISODE_RECENT_COUNT: 0
MDC_IDC_STAT_EPISODE_RECENT_COUNT_DTM_END: NORMAL
MDC_IDC_STAT_EPISODE_RECENT_COUNT_DTM_START: NORMAL
MDC_IDC_STAT_EPISODE_TOTAL_COUNT: 0
MDC_IDC_STAT_EPISODE_TOTAL_COUNT_DTM_END: NORMAL
MDC_IDC_STAT_EPISODE_TOTAL_COUNT_DTM_START: NORMAL
MDC_IDC_STAT_EPISODE_TYPE: NORMAL

## 2019-05-22 ENCOUNTER — ANCILLARY PROCEDURE (OUTPATIENT)
Dept: CARDIOLOGY | Facility: CLINIC | Age: 84
End: 2019-05-22
Payer: MEDICARE

## 2019-05-22 DIAGNOSIS — I48.91 ATRIAL FIBRILLATION AND FLUTTER (H): ICD-10-CM

## 2019-05-22 DIAGNOSIS — I48.92 ATRIAL FIBRILLATION AND FLUTTER (H): ICD-10-CM

## 2019-05-22 PROCEDURE — 93296 REM INTERROG EVL PM/IDS: CPT | Mod: ZF

## 2019-05-22 PROCEDURE — 93294 REM INTERROG EVL PM/LDLS PM: CPT | Performed by: INTERNAL MEDICINE

## 2019-05-28 LAB
MDC_IDC_LEAD_IMPLANT_DT: NORMAL
MDC_IDC_LEAD_IMPLANT_DT: NORMAL
MDC_IDC_LEAD_LOCATION: NORMAL
MDC_IDC_LEAD_LOCATION: NORMAL
MDC_IDC_LEAD_MFG: NORMAL
MDC_IDC_LEAD_MFG: NORMAL
MDC_IDC_LEAD_MODEL: NORMAL
MDC_IDC_LEAD_MODEL: NORMAL
MDC_IDC_LEAD_POLARITY_TYPE: NORMAL
MDC_IDC_LEAD_POLARITY_TYPE: NORMAL
MDC_IDC_LEAD_SERIAL: NORMAL
MDC_IDC_LEAD_SERIAL: NORMAL
MDC_IDC_LEAD_SPECIAL_FUNCTION: NORMAL
MDC_IDC_LEAD_SPECIAL_FUNCTION: NORMAL
MDC_IDC_MSMT_BATTERY_DTM: NORMAL
MDC_IDC_MSMT_BATTERY_REMAINING_LONGEVITY: 111 MO
MDC_IDC_MSMT_BATTERY_RRT_TRIGGER: 2.62
MDC_IDC_MSMT_BATTERY_STATUS: NORMAL
MDC_IDC_MSMT_BATTERY_VOLTAGE: 3.08 V
MDC_IDC_MSMT_LEADCHNL_RA_IMPEDANCE_VALUE: 304 OHM
MDC_IDC_MSMT_LEADCHNL_RA_IMPEDANCE_VALUE: 361 OHM
MDC_IDC_MSMT_LEADCHNL_RA_PACING_THRESHOLD_AMPLITUDE: 0.5 V
MDC_IDC_MSMT_LEADCHNL_RA_PACING_THRESHOLD_PULSEWIDTH: 0.4 MS
MDC_IDC_MSMT_LEADCHNL_RA_SENSING_INTR_AMPL: 1 MV
MDC_IDC_MSMT_LEADCHNL_RA_SENSING_INTR_AMPL: 1 MV
MDC_IDC_MSMT_LEADCHNL_RV_IMPEDANCE_VALUE: 323 OHM
MDC_IDC_MSMT_LEADCHNL_RV_IMPEDANCE_VALUE: 399 OHM
MDC_IDC_MSMT_LEADCHNL_RV_PACING_THRESHOLD_AMPLITUDE: 1.12 V
MDC_IDC_MSMT_LEADCHNL_RV_PACING_THRESHOLD_PULSEWIDTH: 0.4 MS
MDC_IDC_PG_IMPLANT_DTM: NORMAL
MDC_IDC_PG_MFG: NORMAL
MDC_IDC_PG_MODEL: NORMAL
MDC_IDC_PG_SERIAL: NORMAL
MDC_IDC_PG_TYPE: NORMAL
MDC_IDC_SESS_CLINIC_NAME: NORMAL
MDC_IDC_SESS_DTM: NORMAL
MDC_IDC_SESS_TYPE: NORMAL
MDC_IDC_SET_BRADY_AT_MODE_SWITCH_RATE: 150 {BEATS}/MIN
MDC_IDC_SET_BRADY_HYSTRATE: NORMAL
MDC_IDC_SET_BRADY_LOWRATE: 60 {BEATS}/MIN
MDC_IDC_SET_BRADY_MAX_SENSOR_RATE: 120 {BEATS}/MIN
MDC_IDC_SET_BRADY_MAX_TRACKING_RATE: 120 {BEATS}/MIN
MDC_IDC_SET_BRADY_MODE: NORMAL
MDC_IDC_SET_BRADY_PAV_DELAY_LOW: 180 MS
MDC_IDC_SET_BRADY_SAV_DELAY_LOW: 150 MS
MDC_IDC_SET_LEADCHNL_RA_PACING_AMPLITUDE: 1.5 V
MDC_IDC_SET_LEADCHNL_RA_PACING_ANODE_ELECTRODE_1: NORMAL
MDC_IDC_SET_LEADCHNL_RA_PACING_ANODE_LOCATION_1: NORMAL
MDC_IDC_SET_LEADCHNL_RA_PACING_CAPTURE_MODE: NORMAL
MDC_IDC_SET_LEADCHNL_RA_PACING_CATHODE_ELECTRODE_1: NORMAL
MDC_IDC_SET_LEADCHNL_RA_PACING_CATHODE_LOCATION_1: NORMAL
MDC_IDC_SET_LEADCHNL_RA_PACING_POLARITY: NORMAL
MDC_IDC_SET_LEADCHNL_RA_PACING_PULSEWIDTH: 0.4 MS
MDC_IDC_SET_LEADCHNL_RA_SENSING_ANODE_ELECTRODE_1: NORMAL
MDC_IDC_SET_LEADCHNL_RA_SENSING_ANODE_LOCATION_1: NORMAL
MDC_IDC_SET_LEADCHNL_RA_SENSING_CATHODE_ELECTRODE_1: NORMAL
MDC_IDC_SET_LEADCHNL_RA_SENSING_CATHODE_LOCATION_1: NORMAL
MDC_IDC_SET_LEADCHNL_RA_SENSING_POLARITY: NORMAL
MDC_IDC_SET_LEADCHNL_RA_SENSING_SENSITIVITY: 0.3 MV
MDC_IDC_SET_LEADCHNL_RV_PACING_AMPLITUDE: 2.75 V
MDC_IDC_SET_LEADCHNL_RV_PACING_ANODE_ELECTRODE_1: NORMAL
MDC_IDC_SET_LEADCHNL_RV_PACING_ANODE_LOCATION_1: NORMAL
MDC_IDC_SET_LEADCHNL_RV_PACING_CAPTURE_MODE: NORMAL
MDC_IDC_SET_LEADCHNL_RV_PACING_CATHODE_ELECTRODE_1: NORMAL
MDC_IDC_SET_LEADCHNL_RV_PACING_CATHODE_LOCATION_1: NORMAL
MDC_IDC_SET_LEADCHNL_RV_PACING_POLARITY: NORMAL
MDC_IDC_SET_LEADCHNL_RV_PACING_PULSEWIDTH: 0.4 MS
MDC_IDC_SET_LEADCHNL_RV_SENSING_ANODE_ELECTRODE_1: NORMAL
MDC_IDC_SET_LEADCHNL_RV_SENSING_ANODE_LOCATION_1: NORMAL
MDC_IDC_SET_LEADCHNL_RV_SENSING_CATHODE_ELECTRODE_1: NORMAL
MDC_IDC_SET_LEADCHNL_RV_SENSING_CATHODE_LOCATION_1: NORMAL
MDC_IDC_SET_LEADCHNL_RV_SENSING_POLARITY: NORMAL
MDC_IDC_SET_LEADCHNL_RV_SENSING_SENSITIVITY: 0.9 MV
MDC_IDC_SET_ZONE_DETECTION_INTERVAL: 400 MS
MDC_IDC_SET_ZONE_DETECTION_INTERVAL: 400 MS
MDC_IDC_SET_ZONE_TYPE: NORMAL
MDC_IDC_STAT_AT_BURDEN_PERCENT: 0 %
MDC_IDC_STAT_BRADY_AP_VP_PERCENT: 57.61 %
MDC_IDC_STAT_BRADY_AP_VS_PERCENT: 0.01 %
MDC_IDC_STAT_BRADY_AS_VP_PERCENT: 42.33 %
MDC_IDC_STAT_BRADY_AS_VS_PERCENT: 0.05 %
MDC_IDC_STAT_BRADY_DTM_END: NORMAL
MDC_IDC_STAT_BRADY_DTM_START: NORMAL
MDC_IDC_STAT_BRADY_RA_PERCENT_PACED: 57.57 %
MDC_IDC_STAT_BRADY_RV_PERCENT_PACED: 99.94 %
MDC_IDC_STAT_EPISODE_RECENT_COUNT: 0
MDC_IDC_STAT_EPISODE_RECENT_COUNT_DTM_END: NORMAL
MDC_IDC_STAT_EPISODE_RECENT_COUNT_DTM_START: NORMAL
MDC_IDC_STAT_EPISODE_TOTAL_COUNT: 0
MDC_IDC_STAT_EPISODE_TOTAL_COUNT_DTM_END: NORMAL
MDC_IDC_STAT_EPISODE_TOTAL_COUNT_DTM_START: NORMAL
MDC_IDC_STAT_EPISODE_TYPE: NORMAL

## 2019-06-01 ENCOUNTER — OFFICE VISIT (OUTPATIENT)
Dept: CARDIOLOGY | Facility: CLINIC | Age: 84
End: 2019-06-01
Attending: INTERNAL MEDICINE
Payer: MEDICARE

## 2019-06-01 VITALS
HEIGHT: 68 IN | OXYGEN SATURATION: 98 % | SYSTOLIC BLOOD PRESSURE: 137 MMHG | DIASTOLIC BLOOD PRESSURE: 71 MMHG | BODY MASS INDEX: 23.04 KG/M2 | WEIGHT: 152 LBS | HEART RATE: 76 BPM

## 2019-06-01 DIAGNOSIS — Z86.79 H/O AORTIC VALVE REPAIR: Primary | ICD-10-CM

## 2019-06-01 DIAGNOSIS — I10 BENIGN ESSENTIAL HYPERTENSION: ICD-10-CM

## 2019-06-01 DIAGNOSIS — E78.2 MIXED HYPERLIPIDEMIA: ICD-10-CM

## 2019-06-01 DIAGNOSIS — Z98.890 H/O AORTIC VALVE REPAIR: Primary | ICD-10-CM

## 2019-06-01 DIAGNOSIS — Z95.2 S/P TAVR (TRANSCATHETER AORTIC VALVE REPLACEMENT): ICD-10-CM

## 2019-06-01 DIAGNOSIS — I25.10 CORONARY ARTERY DISEASE INVOLVING NATIVE CORONARY ARTERY OF NATIVE HEART WITHOUT ANGINA PECTORIS: ICD-10-CM

## 2019-06-01 PROCEDURE — G0463 HOSPITAL OUTPT CLINIC VISIT: HCPCS | Mod: ZF

## 2019-06-01 PROCEDURE — 99205 OFFICE O/P NEW HI 60 MIN: CPT | Mod: ZP | Performed by: INTERNAL MEDICINE

## 2019-06-01 RX ORDER — ROSUVASTATIN CALCIUM 40 MG/1
40 TABLET, COATED ORAL DAILY
COMMUNITY

## 2019-06-01 ASSESSMENT — MIFFLIN-ST. JEOR: SCORE: 1316.03

## 2019-06-01 ASSESSMENT — PAIN SCALES - GENERAL: PAINLEVEL: NO PAIN (0)

## 2019-06-01 NOTE — PATIENT INSTRUCTIONS
Patient Instructions:  It was a pleasure to see you in the cardiology clinic today.      If you have any questions, you can reach my nurse, Jluis Landeros, at [phone].  Press Option #1 for the [facility], and then press Option #2 Scheduling.    We are encouraging the use of Dormifyt to communicate with your HealthCare Provider    Medication Changes:None    Studies Ordered:  1). Echocardiogram     The results from today include:none    Please follow up: With Dr. Goldstein in 3 months- call us to schedule when you check your calendar for available dates.     Sincerely,    John Goldstein MD     If you have an urgent need after hours (8:00 am to 4:30 pm) please call [phone] and ask for the cardiology fellow on call.

## 2019-06-01 NOTE — LETTER
6/1/2019      RE: Andrew Marrero  [address]  [facility] [address]       Dear Colleague,    Thank you for the opportunity to participate in the care of your patient, Andrew Marrero, at the [facility]. Please see a copy of my visit note below.    June 1, 2019     Dear  ,     I had the pleasure of seeing Andrew Marrero  in the [facility]. He was followed by Dr. Briones here foe many years.  He is a 89-year-old gentleman with severe aortic stenosis recently underwent TAVR in October 2018 at the [facility].  This was complicated by groin pseudoaneurysm requiring coiling.  Prior to the valve replacement he did have a preoperative angiogram which showed mid LAD disease and underwent drug-eluting stent placement on August 16, 2018 to the mid LAD with good results.  He was on Coumadin for 3 months following the valve replacement and after that he was on Plavix and aspirin until February 2019.  As per instruction from [facility] he discontinued Plavix 6 months after the test placement in February 2019.  Currently he continues to be on aspirin only.  Today he presents to the clinic to reestablish care.  He currently denies any cardiac complaints major issue remains dizziness and vertigo that he has occasionally.  He did have extensive work-up for this in the past including most recent head CT which did not show any organic reasons for his symptoms.  Unfortunately he does not have a good answer why he has these episodes and he does have seen neurology neurosurgery as well as ENT for this in the past.  From the valve standpoint he is doing well his exercise tolerance remains good however he does have a few bad days in the month.  No chest pains no shortness of breath no PND orthopnea no palpitations.  No other complaints.     PMH:  #1 Severe, symptomatic aortic valve stenosis status post transfemoral PENNIE with a 29 mm  Andrew [employer] Mohsen 3 valve on 2018  #2 Coronary artery disease status post PCI with a drug-eluting stent (Synergy 2.75 x 16 mm) to the mid LAD on 2018  #3 History of complete heart block status post dual-chamber permanent pacemaker implantation on May 20th, 2008  #4 Hypertension, controlled  #5 Dyslipidemia  #6 Chronic kidney disease, stage III, slightly worse following TAVR  #7 Chronic dizziness with history of vertigo    PAST MEDICAL HISTORY:  Past Medical History:   Diagnosis Date     ACD 2013    normal iron studies and indices     Basal cell carcinoma      GERD (gastroesophageal reflux disease)      Gout      Hallux rigidus     left great toe     Osteoarthritis of both knees      Presence of permanent cardiac pacemaker 2012     Prostate cancer (H)      SOB (shortness of breath) 2012     Vitamin D deficiency      FAMILY HISTORY:  Family History   Problem Relation Age of Onset     Congenital Anomalies Mother         polycystic kidney disease     Hypertension Mother          at age 58 from stoke     Cardiovascular Mother         ruptured aneurysm right cerebral artery     Cerebrovascular Disease Mother      Cardiovascular Father 65        Stroke at 65, lived to his 90's     Cardiovascular Brother 70        CHF age 70     Cardiovascular Brother 67        aortic valve replacement surgery     C.A.D. Maternal Grandfather 65        MI     C.A.D. Paternal Grandfather 65        MI     Cancer Sister         colon     Cerebrovascular Disease Sister         half-sister  stroke at age 72     Skin Cancer No family hx of       SOCIAL HISTORY:  Social History     Socioeconomic History     Marital status:      Spouse name: None     Number of children: None     Years of education: None     Highest education level: None   Occupational History     None   Social Needs     Financial resource strain: None     Food insecurity:     Worry: None     Inability: None      Transportation needs:     Medical: None     Non-medical: None   Tobacco Use     Smoking status: Former Smoker     Last attempt to quit: 1995     Years since quittin.7     Smokeless tobacco: Former User   Substance and Sexual Activity     Alcohol use: Yes     Alcohol/week: 5.0 - 6.0 oz     Types: 10 - 12 Standard drinks or equivalent per week     Comment: regularly     Drug use: No     Sexual activity: Not Currently   Lifestyle     Physical activity:     Days per week: None     Minutes per session: None     Stress: None   Relationships     Social connections:     Talks on phone: None     Gets together: None     Attends Zoroastrian service: None     Active member of club or organization: None     Attends meetings of clubs or organizations: None     Relationship status: None     Intimate partner violence:     Fear of current or ex partner: None     Emotionally abused: None     Physically abused: None     Forced sexual activity: None   Other Topics Concern     Parent/sibling w/ CABG, MI or angioplasty before 65F 55M? Not Asked      Service Not Asked     Blood Transfusions Not Asked     Caffeine Concern Not Asked     Occupational Exposure Not Asked     Hobby Hazards Not Asked     Sleep Concern Not Asked     Stress Concern Not Asked     Weight Concern Not Asked     Special Diet Not Asked     Back Care Not Asked     Exercise Yes     Comment:  daily yoga, cardio     Bike Helmet Not Asked     Seat Belt Not Asked     Self-Exams Not Asked   Social History Narrative     None     CURRENT MEDICATIONS:  Current Outpatient Medications   Medication     allopurinol (ZYLOPRIM) 300 MG tablet     aspirin 81 MG EC tablet     Cholecalciferol (VITAMIN D) 1000 UNIT capsule     DOCUSATE SODIUM PO     melatonin 5 MG tablet     Psyllium (METAMUCIL PO)     rosuvastatin (CRESTOR) 40 MG tablet     atenolol (TENORMIN) 50 MG tablet     clobetasol (TEMOVATE) 0.05 % cream     Perindopril Erbumine 4 MG TABS     simvastatin (ZOCOR) 40  "MG tablet     No current facility-administered medications for this visit.       ROS:   Constitutional: No fever, chills, or sweats. Weight is 152 lbs 0 oz  ENT: No visual disturbance, ear ache, epistaxis, sore throat.   Allergies/Immunologic: Negative.   Respiratory: No cough, hemoptysis.   Cardiovascular: As per HPI.   GI: No nausea, vomiting, hematemesis, melena, or hematochezia.   : No urinary frequency, dysuria, or hematuria.   Integument: Negative.   Psychiatric: Pleasant, no major depression noted  Neuro: No focal neurological deficits noted  Endocrinology: Negative.   Musculoskeletal: As per HPI.      EXAM:  /71 (BP Location: Left arm, Patient Position: Chair, Cuff Size: Adult Regular)   Pulse 76   Ht 1.715 m (5' 7.5\")   Wt 68.9 kg (152 lb)   SpO2 98%   BMI 23.46 kg/m     General: Alert and oriented. No apparent distress.  CV: Crisp S1 and S2. No extra heart sounds noted. Regular rhythm. No murmurs at the base of the neck. Grade 1/6 systolic ejection murmur in the right upper sternal border. No rubs appreciated. Neck veins are flat. No lower extremity edema noted. Radial pulses are 4/4. No RV lifts or heaves appreciated. PMI is normal and nondisplaced.  Chest: Clear lungs to auscultation. No crackles, rhonchi, or wheezes.  Abdomen: Normal bowel sounds. Soft. Non-tender. Non-distended.  Neuro: Mental status intact. Appropriate & conversant. No focal deficits     Labs:  Lab Results   Component Value Date    WBC 10.2 03/25/2015    HGB 12.7 (L) 03/25/2015    HCT 38.8 (L) 03/25/2015     03/25/2015     03/25/2015    POTASSIUM 4.8 03/25/2015    CHLORIDE 106 03/25/2015    CO2 28 03/25/2015    BUN 23 03/25/2015    CR 1.28 (H) 09/22/2016    GLC 91 03/25/2015    SED 10 03/25/2015    DD 0.4 04/05/2013    NTBNP 102 05/08/2012    AST 29 03/25/2015    ALT 26 03/25/2015    ALKPHOS 90 03/25/2015    BILITOTAL 0.6 03/25/2015     Echocardiograms reviewed from [place]     ASSESSMENT AND PLAN:  In summary, " patient is a 89 year old gentleman with recently underwent 29 mm TAVR for severe aortic stenosis and preserved ejection fraction.  Today he presents to reestablish care at the [place].  Offers no new complaints and has been doing very well from the cardiac standpoint.  He is currently only taking aspirin.  For his TAVR valve he will continue aspirin we will get an echocardiogram to evaluate the valve and document his gradients here.  No further interventions are required at this point.  Please note the patient will need prophylactic antibiotic therapy for dental procedures.  He has an allergy to amoxicillin and thus he will need clindamycin therapy.  For his coronary artery disease and stent he is currently off Plavix and will continue aspirin only at this time he is on high intensity statin and he will continue atenolol.  In the future we might consider transitioning him off the atenolol and to metoprolol XL or other medication that is not renal function dependent.  No other interventions are needed at this time.  I will see him back in 3 months time and after that we will be able to extend the follow-ups.     I appreciate the opportunity to participate in the care of Andrew Marrero . Please do not hesitate to contact me with any further questions.    Sincerely,   John Goldstein MD     [facility] Division of Cardiology

## 2019-06-01 NOTE — PROGRESS NOTES
June 1, 2019     Dear  ,     KERI had the pleasure of seeing Andrew Marrero  in the [facility]. He was followed by Dr. Briones here foe many years.  He is a 89-year-old gentleman with severe aortic stenosis recently underwent TAVR in October 2018 at the [facility].  This was complicated by groin pseudoaneurysm requiring coiling.  Prior to the valve replacement he did have a preoperative angiogram which showed mid LAD disease and underwent drug-eluting stent placement on August 16, 2018 to the mid LAD with good results.  He was on Coumadin for 3 months following the valve replacement and after that he was on Plavix and aspirin until February 2019.  As per instruction from [facility] he discontinued Plavix 6 months after the test placement in February 2019.  Currently he continues to be on aspirin only.  Today he presents to the clinic to reestablish care.  He currently denies any cardiac complaints major issue remains dizziness and vertigo that he has occasionally.  He did have extensive work-up for this in the past including most recent head CT which did not show any organic reasons for his symptoms.  Unfortunately he does not have a good answer why he has these episodes and he does have seen neurology neurosurgery as well as ENT for this in the past.  From the valve standpoint he is doing well his exercise tolerance remains good however he does have a few bad days in the month.  No chest pains no shortness of breath no PND orthopnea no palpitations.  No other complaints.     PMH:  #1 Severe, symptomatic aortic valve stenosis status post transfemoral PENNIE with a 29 mm Andrew [employer] Mohsen 3 valve on October 3rd, 2018  #2 Coronary artery disease status post PCI with a drug-eluting stent (Synergy 2.75 x 16 mm) to the mid LAD on August 16th, 2018  #3 History of complete heart block status post dual-chamber permanent pacemaker implantation on May 20th, 2008  #4 Hypertension,  controlled  #5 Dyslipidemia  #6 Chronic kidney disease, stage III, slightly worse following TAVR  #7 Chronic dizziness with history of vertigo    PAST MEDICAL HISTORY:  Past Medical History:   Diagnosis Date     ACD 2013    normal iron studies and indices     Basal cell carcinoma      GERD (gastroesophageal reflux disease)      Gout      Hallux rigidus     left great toe     Osteoarthritis of both knees      Presence of permanent cardiac pacemaker 2012     Prostate cancer (H)      SOB (shortness of breath) 2012     Vitamin D deficiency      FAMILY HISTORY:  Family History   Problem Relation Age of Onset     Congenital Anomalies Mother         polycystic kidney disease     Hypertension Mother          at age 58 from stoke     Cardiovascular Mother         ruptured aneurysm right cerebral artery     Cerebrovascular Disease Mother      Cardiovascular Father 65        Stroke at 65, lived to his 90's     Cardiovascular Brother 70        CHF age 70     Cardiovascular Brother 67        aortic valve replacement surgery     C.A.D. Maternal Grandfather 65        MI     C.A.D. Paternal Grandfather 65        MI     Cancer Sister         colon     Cerebrovascular Disease Sister         half-sister  stroke at age 72     Skin Cancer No family hx of       SOCIAL HISTORY:  Social History     Socioeconomic History     Marital status:      Spouse name: None     Number of children: None     Years of education: None     Highest education level: None   Occupational History     None   Social Needs     Financial resource strain: None     Food insecurity:     Worry: None     Inability: None     Transportation needs:     Medical: None     Non-medical: None   Tobacco Use     Smoking status: Former Smoker     Last attempt to quit: 1995     Years since quittin.7     Smokeless tobacco: Former User   Substance and Sexual Activity     Alcohol use: Yes     Alcohol/week: 5.0 - 6.0 oz     Types: 10 - 12 Standard  drinks or equivalent per week     Comment: regularly     Drug use: No     Sexual activity: Not Currently   Lifestyle     Physical activity:     Days per week: None     Minutes per session: None     Stress: None   Relationships     Social connections:     Talks on phone: None     Gets together: None     Attends Worship service: None     Active member of club or organization: None     Attends meetings of clubs or organizations: None     Relationship status: None     Intimate partner violence:     Fear of current or ex partner: None     Emotionally abused: None     Physically abused: None     Forced sexual activity: None   Other Topics Concern     Parent/sibling w/ CABG, MI or angioplasty before 65F 55M? Not Asked      Service Not Asked     Blood Transfusions Not Asked     Caffeine Concern Not Asked     Occupational Exposure Not Asked     Hobby Hazards Not Asked     Sleep Concern Not Asked     Stress Concern Not Asked     Weight Concern Not Asked     Special Diet Not Asked     Back Care Not Asked     Exercise Yes     Comment:  daily yoga, cardio     Bike Helmet Not Asked     Seat Belt Not Asked     Self-Exams Not Asked   Social History Narrative     None     CURRENT MEDICATIONS:  Current Outpatient Medications   Medication     allopurinol (ZYLOPRIM) 300 MG tablet     aspirin 81 MG EC tablet     Cholecalciferol (VITAMIN D) 1000 UNIT capsule     DOCUSATE SODIUM PO     melatonin 5 MG tablet     Psyllium (METAMUCIL PO)     rosuvastatin (CRESTOR) 40 MG tablet     atenolol (TENORMIN) 50 MG tablet     clobetasol (TEMOVATE) 0.05 % cream     Perindopril Erbumine 4 MG TABS     simvastatin (ZOCOR) 40 MG tablet     No current facility-administered medications for this visit.       ROS:   Constitutional: No fever, chills, or sweats. Weight is 152 lbs 0 oz  ENT: No visual disturbance, ear ache, epistaxis, sore throat.   Allergies/Immunologic: Negative.   Respiratory: No cough, hemoptysis.   Cardiovascular: As per HPI.  "  GI: No nausea, vomiting, hematemesis, melena, or hematochezia.   : No urinary frequency, dysuria, or hematuria.   Integument: Negative.   Psychiatric: Pleasant, no major depression noted  Neuro: No focal neurological deficits noted  Endocrinology: Negative.   Musculoskeletal: As per HPI.      EXAM:  /71 (BP Location: Left arm, Patient Position: Chair, Cuff Size: Adult Regular)   Pulse 76   Ht 1.715 m (5' 7.5\")   Wt 68.9 kg (152 lb)   SpO2 98%   BMI 23.46 kg/m    General: Alert and oriented. No apparent distress.  CV: Crisp S1 and S2. No extra heart sounds noted. Regular rhythm. No murmurs at the base of the neck. Grade 1/6 systolic ejection murmur in the right upper sternal border. No rubs appreciated. Neck veins are flat. No lower extremity edema noted. Radial pulses are 4/4. No RV lifts or heaves appreciated. PMI is normal and nondisplaced.  Chest: Clear lungs to auscultation. No crackles, rhonchi, or wheezes.  Abdomen: Normal bowel sounds. Soft. Non-tender. Non-distended.  Neuro: Mental status intact. Appropriate & conversant. No focal deficits     Labs:  Lab Results   Component Value Date    WBC 10.2 03/25/2015    HGB 12.7 (L) 03/25/2015    HCT 38.8 (L) 03/25/2015     03/25/2015     03/25/2015    POTASSIUM 4.8 03/25/2015    CHLORIDE 106 03/25/2015    CO2 28 03/25/2015    BUN 23 03/25/2015    CR 1.28 (H) 09/22/2016    GLC 91 03/25/2015    SED 10 03/25/2015    DD 0.4 04/05/2013    NTBNP 102 05/08/2012    AST 29 03/25/2015    ALT 26 03/25/2015    ALKPHOS 90 03/25/2015    BILITOTAL 0.6 03/25/2015     Echocardiograms reviewed from [place]     ASSESSMENT AND PLAN:  In summary, patient is a 89 year old gentleman with recently underwent 29 mm TAVR for severe aortic stenosis and preserved ejection fraction.  Today he presents to reestablish care at the [place].  Offers no new complaints and has been doing very well from the cardiac standpoint.  He is currently only taking aspirin.  For his " TAVR valve he will continue aspirin we will get an echocardiogram to evaluate the valve and document his gradients here.  No further interventions are required at this point.  Please note the patient will need prophylactic antibiotic therapy for dental procedures.  He has an allergy to amoxicillin and thus he will need clindamycin therapy.  For his coronary artery disease and stent he is currently off Plavix and will continue aspirin only at this time he is on high intensity statin and he will continue atenolol.  In the future we might consider transitioning him off the atenolol and to metoprolol XL or other medication that is not renal function dependent.  No other interventions are needed at this time.  I will see him back in 3 months time and after that we will be able to extend the follow-ups.     I appreciate the opportunity to participate in the care of Andrew Marrero . Please do not hesitate to contact me with any further questions.    Sincerely,   John Goldstein MD     [facility] Division of Cardiology

## 2019-06-01 NOTE — NURSING NOTE
Chief Complaint   Patient presents with     Follow Up     establish cardiologist     Vitals were taken and medications were reconciled.     Daniella Holman,ALANNA  10:56 AM

## 2019-06-04 ENCOUNTER — PATIENT OUTREACH (OUTPATIENT)
Dept: CARDIOLOGY | Facility: CLINIC | Age: 84
End: 2019-06-04

## 2019-06-04 ENCOUNTER — TELEPHONE (OUTPATIENT)
Dept: CARDIOLOGY | Facility: CLINIC | Age: 84
End: 2019-06-04

## 2019-06-04 DIAGNOSIS — R06.02 SOB (SHORTNESS OF BREATH): Primary | ICD-10-CM

## 2019-06-04 NOTE — PROGRESS NOTES
Patient was called and echocardiogram procedure. Patient is concerned about possibility of contrast being used given that he has stage 3 kidney failure. Patient was told that the contrast they use in performing the echocardiogram is different then the CT contrast but patient states that he wants no type of contrast used, he will walk out of echo room if they attempt to give him anything via an IV. New orders placed for the echocardiogram requesting that no IV be started and that no contrast be given.

## 2019-06-04 NOTE — TELEPHONE ENCOUNTER
[facility]    Phone Message    May a detailed message be left on voicemail: yes    Reason for Call: Order(s): Echocardiogram    Reason for Concern: Andrew has stage 3 kidney disease and is concerned that his after visit summary for the Echo Cardiogram indicates that contrast would be given for the procedure.  He cannot have contrast and would like to receive a call to discuss his concerns and changing the order.    Date needed: ASAP    Provider name: Dr. Goldstein      Action Taken: Message routed to:  [facility] cardiology

## 2019-06-14 ENCOUNTER — ANCILLARY PROCEDURE (OUTPATIENT)
Dept: CARDIOLOGY | Facility: CLINIC | Age: 84
End: 2019-06-14
Attending: INTERNAL MEDICINE
Payer: MEDICARE

## 2019-06-14 DIAGNOSIS — R06.02 SOB (SHORTNESS OF BREATH): ICD-10-CM

## 2019-07-08 ENCOUNTER — TELEPHONE (OUTPATIENT)
Dept: CARDIOLOGY | Facility: CLINIC | Age: 84
End: 2019-07-08

## 2019-07-08 NOTE — TELEPHONE ENCOUNTER
[facility]    Phone Message    May a detailed message be left on voicemail: yes    Reason for Call: Requesting Results   Name/type of test: Electrocardiogram   Date of test: 06/14  Was test done at a location other than [facility] (Please fill in the location if not [facility])?: No      Action Taken: Message routed to:  [facility]

## 2019-07-11 NOTE — TELEPHONE ENCOUNTER
Echo report released to [facility], patient was called and given results of the echocardiogram. All questions answered to patient's satisfaction.

## 2019-09-28 ENCOUNTER — HEALTH MAINTENANCE LETTER (OUTPATIENT)
Age: 84
End: 2019-09-28

## 2019-10-15 ENCOUNTER — ANCILLARY PROCEDURE (OUTPATIENT)
Dept: CARDIOLOGY | Facility: CLINIC | Age: 84
End: 2019-10-15
Attending: INTERNAL MEDICINE
Payer: MEDICARE

## 2019-10-15 DIAGNOSIS — I48.91 ATRIAL FIBRILLATION (H): ICD-10-CM

## 2019-10-24 LAB
MDC_IDC_EPISODE_DTM: NORMAL
MDC_IDC_EPISODE_DURATION: 1 S
MDC_IDC_EPISODE_DURATION: 231 S
MDC_IDC_EPISODE_DURATION: 451 S
MDC_IDC_EPISODE_ID: 1
MDC_IDC_EPISODE_ID: 2
MDC_IDC_EPISODE_ID: 3
MDC_IDC_EPISODE_TYPE: NORMAL
MDC_IDC_LEAD_IMPLANT_DT: NORMAL
MDC_IDC_LEAD_IMPLANT_DT: NORMAL
MDC_IDC_LEAD_LOCATION: NORMAL
MDC_IDC_LEAD_LOCATION: NORMAL
MDC_IDC_LEAD_MFG: NORMAL
MDC_IDC_LEAD_MFG: NORMAL
MDC_IDC_LEAD_MODEL: NORMAL
MDC_IDC_LEAD_MODEL: NORMAL
MDC_IDC_LEAD_POLARITY_TYPE: NORMAL
MDC_IDC_LEAD_POLARITY_TYPE: NORMAL
MDC_IDC_LEAD_SERIAL: NORMAL
MDC_IDC_LEAD_SERIAL: NORMAL
MDC_IDC_LEAD_SPECIAL_FUNCTION: NORMAL
MDC_IDC_LEAD_SPECIAL_FUNCTION: NORMAL
MDC_IDC_MSMT_BATTERY_DTM: NORMAL
MDC_IDC_MSMT_BATTERY_REMAINING_LONGEVITY: 109 MO
MDC_IDC_MSMT_BATTERY_RRT_TRIGGER: 2.62
MDC_IDC_MSMT_BATTERY_STATUS: NORMAL
MDC_IDC_MSMT_BATTERY_VOLTAGE: 3.03 V
MDC_IDC_MSMT_LEADCHNL_RA_IMPEDANCE_VALUE: 361 OHM
MDC_IDC_MSMT_LEADCHNL_RA_IMPEDANCE_VALUE: 361 OHM
MDC_IDC_MSMT_LEADCHNL_RA_IMPEDANCE_VALUE: 418 OHM
MDC_IDC_MSMT_LEADCHNL_RA_IMPEDANCE_VALUE: 418 OHM
MDC_IDC_MSMT_LEADCHNL_RA_PACING_THRESHOLD_AMPLITUDE: 0.5 V
MDC_IDC_MSMT_LEADCHNL_RA_PACING_THRESHOLD_AMPLITUDE: 0.75 V
MDC_IDC_MSMT_LEADCHNL_RA_PACING_THRESHOLD_PULSEWIDTH: 0.4 MS
MDC_IDC_MSMT_LEADCHNL_RA_PACING_THRESHOLD_PULSEWIDTH: 0.4 MS
MDC_IDC_MSMT_LEADCHNL_RA_SENSING_INTR_AMPL: 0.38 MV
MDC_IDC_MSMT_LEADCHNL_RA_SENSING_INTR_AMPL: 0.38 MV
MDC_IDC_MSMT_LEADCHNL_RA_SENSING_INTR_AMPL: 1.12 MV
MDC_IDC_MSMT_LEADCHNL_RA_SENSING_INTR_AMPL: 1.12 MV
MDC_IDC_MSMT_LEADCHNL_RV_IMPEDANCE_VALUE: 361 OHM
MDC_IDC_MSMT_LEADCHNL_RV_IMPEDANCE_VALUE: 361 OHM
MDC_IDC_MSMT_LEADCHNL_RV_IMPEDANCE_VALUE: 437 OHM
MDC_IDC_MSMT_LEADCHNL_RV_IMPEDANCE_VALUE: 437 OHM
MDC_IDC_MSMT_LEADCHNL_RV_PACING_THRESHOLD_AMPLITUDE: 1.25 V
MDC_IDC_MSMT_LEADCHNL_RV_PACING_THRESHOLD_AMPLITUDE: 1.25 V
MDC_IDC_MSMT_LEADCHNL_RV_PACING_THRESHOLD_PULSEWIDTH: 0.4 MS
MDC_IDC_MSMT_LEADCHNL_RV_PACING_THRESHOLD_PULSEWIDTH: 0.4 MS
MDC_IDC_MSMT_LEADCHNL_RV_SENSING_INTR_AMPL: 14.88 MV
MDC_IDC_MSMT_LEADCHNL_RV_SENSING_INTR_AMPL: 14.88 MV
MDC_IDC_PG_IMPLANT_DTM: NORMAL
MDC_IDC_PG_MFG: NORMAL
MDC_IDC_PG_MODEL: NORMAL
MDC_IDC_PG_SERIAL: NORMAL
MDC_IDC_PG_TYPE: NORMAL
MDC_IDC_SESS_CLINIC_NAME: NORMAL
MDC_IDC_SESS_DTM: NORMAL
MDC_IDC_SESS_TYPE: NORMAL
MDC_IDC_SET_BRADY_AT_MODE_SWITCH_RATE: 150 {BEATS}/MIN
MDC_IDC_SET_BRADY_HYSTRATE: NORMAL
MDC_IDC_SET_BRADY_LOWRATE: 60 {BEATS}/MIN
MDC_IDC_SET_BRADY_MAX_SENSOR_RATE: 120 {BEATS}/MIN
MDC_IDC_SET_BRADY_MAX_TRACKING_RATE: 120 {BEATS}/MIN
MDC_IDC_SET_BRADY_MODE: NORMAL
MDC_IDC_SET_BRADY_PAV_DELAY_LOW: 180 MS
MDC_IDC_SET_BRADY_SAV_DELAY_LOW: 150 MS
MDC_IDC_SET_LEADCHNL_RA_PACING_AMPLITUDE: 1.5 V
MDC_IDC_SET_LEADCHNL_RA_PACING_ANODE_ELECTRODE_1: NORMAL
MDC_IDC_SET_LEADCHNL_RA_PACING_ANODE_LOCATION_1: NORMAL
MDC_IDC_SET_LEADCHNL_RA_PACING_CAPTURE_MODE: NORMAL
MDC_IDC_SET_LEADCHNL_RA_PACING_CATHODE_ELECTRODE_1: NORMAL
MDC_IDC_SET_LEADCHNL_RA_PACING_CATHODE_LOCATION_1: NORMAL
MDC_IDC_SET_LEADCHNL_RA_PACING_POLARITY: NORMAL
MDC_IDC_SET_LEADCHNL_RA_PACING_PULSEWIDTH: 0.4 MS
MDC_IDC_SET_LEADCHNL_RA_SENSING_ANODE_ELECTRODE_1: NORMAL
MDC_IDC_SET_LEADCHNL_RA_SENSING_ANODE_LOCATION_1: NORMAL
MDC_IDC_SET_LEADCHNL_RA_SENSING_CATHODE_ELECTRODE_1: NORMAL
MDC_IDC_SET_LEADCHNL_RA_SENSING_CATHODE_LOCATION_1: NORMAL
MDC_IDC_SET_LEADCHNL_RA_SENSING_POLARITY: NORMAL
MDC_IDC_SET_LEADCHNL_RA_SENSING_SENSITIVITY: 0.3 MV
MDC_IDC_SET_LEADCHNL_RV_PACING_AMPLITUDE: 2.75 V
MDC_IDC_SET_LEADCHNL_RV_PACING_ANODE_ELECTRODE_1: NORMAL
MDC_IDC_SET_LEADCHNL_RV_PACING_ANODE_LOCATION_1: NORMAL
MDC_IDC_SET_LEADCHNL_RV_PACING_CAPTURE_MODE: NORMAL
MDC_IDC_SET_LEADCHNL_RV_PACING_CATHODE_ELECTRODE_1: NORMAL
MDC_IDC_SET_LEADCHNL_RV_PACING_CATHODE_LOCATION_1: NORMAL
MDC_IDC_SET_LEADCHNL_RV_PACING_POLARITY: NORMAL
MDC_IDC_SET_LEADCHNL_RV_PACING_PULSEWIDTH: 0.4 MS
MDC_IDC_SET_LEADCHNL_RV_SENSING_ANODE_ELECTRODE_1: NORMAL
MDC_IDC_SET_LEADCHNL_RV_SENSING_ANODE_LOCATION_1: NORMAL
MDC_IDC_SET_LEADCHNL_RV_SENSING_CATHODE_ELECTRODE_1: NORMAL
MDC_IDC_SET_LEADCHNL_RV_SENSING_CATHODE_LOCATION_1: NORMAL
MDC_IDC_SET_LEADCHNL_RV_SENSING_POLARITY: NORMAL
MDC_IDC_SET_LEADCHNL_RV_SENSING_SENSITIVITY: 0.9 MV
MDC_IDC_SET_ZONE_DETECTION_INTERVAL: 400 MS
MDC_IDC_SET_ZONE_DETECTION_INTERVAL: 400 MS
MDC_IDC_SET_ZONE_TYPE: NORMAL
MDC_IDC_STAT_BRADY_AP_VP_PERCENT: 55.06 %
MDC_IDC_STAT_BRADY_AP_VS_PERCENT: 0 %
MDC_IDC_STAT_BRADY_AS_VP_PERCENT: 44.87 %
MDC_IDC_STAT_BRADY_AS_VS_PERCENT: 0.07 %
MDC_IDC_STAT_BRADY_DTM_END: NORMAL
MDC_IDC_STAT_BRADY_DTM_START: NORMAL
MDC_IDC_STAT_BRADY_RA_PERCENT_PACED: 55.04 %
MDC_IDC_STAT_BRADY_RV_PERCENT_PACED: 99.93 %
MDC_IDC_STAT_EPISODE_RECENT_COUNT: 0
MDC_IDC_STAT_EPISODE_RECENT_COUNT: 1
MDC_IDC_STAT_EPISODE_RECENT_COUNT: 2
MDC_IDC_STAT_EPISODE_RECENT_COUNT_DTM_END: NORMAL
MDC_IDC_STAT_EPISODE_RECENT_COUNT_DTM_START: NORMAL
MDC_IDC_STAT_EPISODE_TOTAL_COUNT: 0
MDC_IDC_STAT_EPISODE_TOTAL_COUNT: 1
MDC_IDC_STAT_EPISODE_TOTAL_COUNT: 2
MDC_IDC_STAT_EPISODE_TOTAL_COUNT_DTM_END: NORMAL
MDC_IDC_STAT_EPISODE_TOTAL_COUNT_DTM_START: NORMAL
MDC_IDC_STAT_EPISODE_TYPE: NORMAL

## 2020-03-15 ENCOUNTER — HEALTH MAINTENANCE LETTER (OUTPATIENT)
Age: 85
End: 2020-03-15

## 2021-01-10 ENCOUNTER — HEALTH MAINTENANCE LETTER (OUTPATIENT)
Age: 86
End: 2021-01-10

## 2021-05-08 ENCOUNTER — HEALTH MAINTENANCE LETTER (OUTPATIENT)
Age: 86
End: 2021-05-08

## 2021-05-26 ENCOUNTER — RECORDS - HEALTHEAST (OUTPATIENT)
Dept: ADMINISTRATIVE | Facility: CLINIC | Age: 86
End: 2021-05-26

## 2021-05-27 ENCOUNTER — RECORDS - HEALTHEAST (OUTPATIENT)
Dept: ADMINISTRATIVE | Facility: CLINIC | Age: 86
End: 2021-05-27

## 2021-05-28 ENCOUNTER — RECORDS - HEALTHEAST (OUTPATIENT)
Dept: ADMINISTRATIVE | Facility: CLINIC | Age: 86
End: 2021-05-28

## 2021-05-29 ENCOUNTER — RECORDS - HEALTHEAST (OUTPATIENT)
Dept: ADMINISTRATIVE | Facility: CLINIC | Age: 86
End: 2021-05-29

## 2021-05-31 ENCOUNTER — RECORDS - HEALTHEAST (OUTPATIENT)
Dept: ADMINISTRATIVE | Facility: CLINIC | Age: 86
End: 2021-05-31

## 2021-06-01 ENCOUNTER — RECORDS - HEALTHEAST (OUTPATIENT)
Dept: ADMINISTRATIVE | Facility: CLINIC | Age: 86
End: 2021-06-01

## 2021-06-02 ENCOUNTER — RECORDS - HEALTHEAST (OUTPATIENT)
Dept: ADMINISTRATIVE | Facility: CLINIC | Age: 86
End: 2021-06-02

## 2021-06-03 ENCOUNTER — RECORDS - HEALTHEAST (OUTPATIENT)
Dept: ADMINISTRATIVE | Facility: CLINIC | Age: 86
End: 2021-06-03

## 2021-10-23 ENCOUNTER — HEALTH MAINTENANCE LETTER (OUTPATIENT)
Age: 86
End: 2021-10-23

## 2022-06-04 ENCOUNTER — HEALTH MAINTENANCE LETTER (OUTPATIENT)
Age: 87
End: 2022-06-04

## 2022-10-10 ENCOUNTER — HEALTH MAINTENANCE LETTER (OUTPATIENT)
Age: 87
End: 2022-10-10

## 2023-01-01 ENCOUNTER — HEALTH MAINTENANCE LETTER (OUTPATIENT)
Age: 88
End: 2023-01-01

## 2024-01-01 ENCOUNTER — LAB REQUISITION (OUTPATIENT)
Dept: LAB | Facility: CLINIC | Age: 89
End: 2024-01-01
Payer: MEDICARE

## 2024-01-01 DIAGNOSIS — M10.9 GOUT, UNSPECIFIED: ICD-10-CM

## 2024-01-01 DIAGNOSIS — R41.3 OTHER AMNESIA: ICD-10-CM

## 2024-01-01 DIAGNOSIS — N18.9 CHRONIC KIDNEY DISEASE, UNSPECIFIED: ICD-10-CM

## 2024-01-01 LAB
ANION GAP SERPL CALCULATED.3IONS-SCNC: 15 MMOL/L (ref 7–15)
BUN SERPL-MCNC: 20.1 MG/DL (ref 8–23)
CALCIUM SERPL-MCNC: 9.8 MG/DL (ref 8.2–9.6)
CHLORIDE SERPL-SCNC: 106 MMOL/L (ref 98–107)
CREAT SERPL-MCNC: 1.35 MG/DL (ref 0.67–1.17)
DEPRECATED HCO3 PLAS-SCNC: 27 MMOL/L (ref 22–29)
EGFRCR SERPLBLD CKD-EPI 2021: 49 ML/MIN/1.73M2
ERYTHROCYTE [DISTWIDTH] IN BLOOD BY AUTOMATED COUNT: 13.9 % (ref 10–15)
FERRITIN SERPL-MCNC: 796 NG/ML (ref 31–409)
FOLATE SERPL-MCNC: 20.3 NG/ML (ref 4.6–34.8)
GLUCOSE SERPL-MCNC: 73 MG/DL (ref 70–99)
HCT VFR BLD AUTO: 36.1 % (ref 40–53)
HGB BLD-MCNC: 10.9 G/DL (ref 13.3–17.7)
IRON BINDING CAPACITY (ROCHE): 155 UG/DL (ref 240–430)
IRON SATN MFR SERPL: 31 % (ref 15–46)
IRON SERPL-MCNC: 48 UG/DL (ref 61–157)
MCH RBC QN AUTO: 27.5 PG (ref 26.5–33)
MCHC RBC AUTO-ENTMCNC: 30.2 G/DL (ref 31.5–36.5)
MCV RBC AUTO: 91 FL (ref 78–100)
PLATELET # BLD AUTO: 765 10E3/UL (ref 150–450)
POTASSIUM SERPL-SCNC: 3.9 MMOL/L (ref 3.4–5.3)
RBC # BLD AUTO: 3.96 10E6/UL (ref 4.4–5.9)
SODIUM SERPL-SCNC: 148 MMOL/L (ref 135–145)
TSH SERPL DL<=0.005 MIU/L-ACNC: 2.22 UIU/ML (ref 0.3–4.2)
URATE SERPL-MCNC: 3.4 MG/DL (ref 3.4–7)
VIT B12 SERPL-MCNC: 1373 PG/ML (ref 232–1245)
WBC # BLD AUTO: 9.9 10E3/UL (ref 4–11)

## 2024-01-01 PROCEDURE — P9604 ONE-WAY ALLOW PRORATED TRIP: HCPCS | Performed by: INTERNAL MEDICINE

## 2024-01-01 PROCEDURE — 36415 COLL VENOUS BLD VENIPUNCTURE: CPT | Performed by: INTERNAL MEDICINE

## 2024-01-01 PROCEDURE — 83550 IRON BINDING TEST: CPT | Performed by: INTERNAL MEDICINE

## 2024-01-01 PROCEDURE — 82746 ASSAY OF FOLIC ACID SERUM: CPT | Performed by: INTERNAL MEDICINE

## 2024-01-01 PROCEDURE — 84443 ASSAY THYROID STIM HORMONE: CPT | Performed by: INTERNAL MEDICINE

## 2024-01-01 PROCEDURE — 80048 BASIC METABOLIC PNL TOTAL CA: CPT | Performed by: INTERNAL MEDICINE

## 2024-01-01 PROCEDURE — 84550 ASSAY OF BLOOD/URIC ACID: CPT | Performed by: INTERNAL MEDICINE

## 2024-01-01 PROCEDURE — 82607 VITAMIN B-12: CPT | Performed by: INTERNAL MEDICINE

## 2024-01-01 PROCEDURE — 82728 ASSAY OF FERRITIN: CPT | Performed by: INTERNAL MEDICINE

## 2024-01-01 PROCEDURE — 85014 HEMATOCRIT: CPT | Performed by: INTERNAL MEDICINE

## 2024-02-19 ENCOUNTER — LAB REQUISITION (OUTPATIENT)
Dept: LAB | Facility: CLINIC | Age: 89
End: 2024-02-19
Payer: MEDICARE

## 2024-02-19 DIAGNOSIS — E87.0 HYPEROSMOLALITY AND HYPERNATREMIA: ICD-10-CM
